# Patient Record
Sex: FEMALE | Race: WHITE | NOT HISPANIC OR LATINO | Employment: OTHER | URBAN - METROPOLITAN AREA
[De-identification: names, ages, dates, MRNs, and addresses within clinical notes are randomized per-mention and may not be internally consistent; named-entity substitution may affect disease eponyms.]

---

## 2017-09-25 ENCOUNTER — OFFICE VISIT (OUTPATIENT)
Dept: INTERNAL MEDICINE | Facility: CLINIC | Age: 77
End: 2017-09-25

## 2017-09-25 VITALS
BODY MASS INDEX: 21.31 KG/M2 | WEIGHT: 132 LBS | SYSTOLIC BLOOD PRESSURE: 128 MMHG | HEART RATE: 67 BPM | DIASTOLIC BLOOD PRESSURE: 62 MMHG | OXYGEN SATURATION: 98 %

## 2017-09-25 DIAGNOSIS — R10.32 LEFT LOWER QUADRANT PAIN: Primary | ICD-10-CM

## 2017-09-25 PROCEDURE — 99213 OFFICE O/P EST LOW 20 MIN: CPT | Performed by: PHYSICIAN ASSISTANT

## 2017-09-25 NOTE — PROGRESS NOTES
Chief Complaint   Patient presents with   • Follow-up     UTC at [facility] on 09/23/17 abdominal pain       Subjective   Cathy Crockett is a 76 y.o. female.       History of Present Illness     Pt went to the [facility] this past weekend, 2 days ago with abdominal pain. She was treated with Flagyl and has had some improvement. She woke up the day prior to urgent care visit with LLQ pain, was relieved with 2 BMs. Still has some discomfort with bending and palpation. She started liquid diet but because mild pain continued, she went to urgent care. Since starting the flagyl she has had improvement. Has had mucousy BMs since then. Today she feels a little bloated, started soft foods yesterday. Not hurting. Has not checked temp since urgent care, temp was 99 then. She is tolerating the flagyl.      Current Outpatient Prescriptions:   •  metroNIDAZOLE (FLAGYL) 500 MG tablet, Take 1 tablet by mouth 2 (Two) Times a Day for 7 days., Disp: 14 tablet, Rfl: 0     PMFSH  The following portions of the patient's history were reviewed and updated as appropriate: allergies, current medications, past family history, past medical history, past social history, past surgical history and problem list.    Review of Systems   Constitutional: Positive for appetite change. Negative for activity change, chills, diaphoresis, fatigue and fever.   HENT: Negative.    Respiratory: Negative for chest tightness, shortness of breath and wheezing.    Cardiovascular: Negative for chest pain.   Gastrointestinal: Positive for abdominal pain. Negative for abdominal distention, blood in stool, constipation, diarrhea and nausea.   Genitourinary: Negative for dysuria and hematuria.   Musculoskeletal: Negative for arthralgias and myalgias.   Neurological: Negative for dizziness, syncope, weakness and light-headedness.   Psychiatric/Behavioral: Negative.        Objective   /62  Pulse 67  Wt 132 lb (59.9 kg)  SpO2 98%  BMI 21.31  kg/m2    Physical Exam   Constitutional: She appears well-developed and well-nourished.   HENT:   Head: Normocephalic.   Right Ear: Hearing, tympanic membrane, external ear and ear canal normal.   Left Ear: Hearing, tympanic membrane, external ear and ear canal normal.   Nose: Nose normal.   Mouth/Throat: Oropharynx is clear and moist.   Eyes: Conjunctivae are normal. Pupils are equal, round, and reactive to light.   Neck: Normal range of motion.   Cardiovascular: Normal rate, regular rhythm and normal heart sounds.    Pulmonary/Chest: Effort normal and breath sounds normal. She has no decreased breath sounds. She has no wheezes. She has no rhonchi. She has no rales.   Abdominal: Soft. Normal appearance and bowel sounds are normal. There is no hepatosplenomegaly. There is tenderness (mild) in the left lower quadrant. There is no rigidity, no rebound, no guarding and no CVA tenderness. No hernia.   Musculoskeletal: Normal range of motion.   Neurological: She is alert.   Skin: Skin is warm and dry.   Psychiatric: She has a normal mood and affect. Her behavior is normal.   Nursing note and vitals reviewed.           ASSESSMENT/PLAN    Problem List Items Addressed This Visit     None      Visit Diagnoses     Left lower quadrant pain    -  Primary    Improving with flagyl. Continue treatment and progress diet as tolerated. Maintain fluid intake. RTC if symptoms worsen or do not resolve completely.               Return for Medicare Wellness with Dr Arana.

## 2017-09-29 PROBLEM — R76.11 POSITIVE REACTION TO TUBERCULIN SKIN TEST: Status: ACTIVE | Noted: 2017-09-29

## 2017-09-29 PROBLEM — G47.00 INSOMNIA: Status: ACTIVE | Noted: 2017-09-29

## 2017-09-29 PROBLEM — J18.9 PNEUMONIA: Status: ACTIVE | Noted: 2017-09-29

## 2017-09-29 PROBLEM — R13.10 DYSPHAGIA: Status: RESOLVED | Noted: 2017-09-29 | Resolved: 2017-09-29

## 2017-09-29 PROBLEM — C44.91 BASAL CELL CARCINOMA OF SKIN: Status: ACTIVE | Noted: 2017-09-29

## 2017-09-29 PROBLEM — R10.13 EPIGASTRIC PAIN: Status: RESOLVED | Noted: 2017-09-29 | Resolved: 2017-09-29

## 2017-09-29 PROBLEM — R13.10 DYSPHAGIA: Status: ACTIVE | Noted: 2017-09-29

## 2017-09-29 PROBLEM — R10.13 EPIGASTRIC PAIN: Status: ACTIVE | Noted: 2017-09-29

## 2017-09-29 PROBLEM — J18.9 PNEUMONIA: Status: RESOLVED | Noted: 2017-09-29 | Resolved: 2017-09-29

## 2017-09-29 PROBLEM — K57.30 DIVERTICULOSIS OF LARGE INTESTINE: Status: ACTIVE | Noted: 2017-09-29

## 2017-09-29 PROBLEM — Z78.0 MENOPAUSE: Status: ACTIVE | Noted: 2017-09-29

## 2017-10-04 ENCOUNTER — TELEPHONE (OUTPATIENT)
Dept: INTERNAL MEDICINE | Facility: CLINIC | Age: 77
End: 2017-10-04

## 2017-10-04 NOTE — TELEPHONE ENCOUNTER
Spoke to patient.     She is improving. She will keep follow up appt on Friday.     Records requested from [facility].

## 2017-10-04 NOTE — TELEPHONE ENCOUNTER
PT WAS SEEN IN THE ER Monday AT [facility]  THEY DID A CT SCAN ABDOMEN AND SHE WANTED TO KNOW IF WE COULD GET THE RECORDS. SHE WAS STARTED ON FLAGYL AND CIPRO FOR DIVERTICULITIS   SHE MADE AN APPT TO SEE AMY ON Friday.  SHE SAW HER RECENTLY SHE WANTED TO KNOW IF WHEN SHE WANTED TO KNOW WHEN SHE SHOULD BE SEEN OR IF Friday IS OK  PLEASE ADVISE  [phone]

## 2017-10-06 ENCOUNTER — OFFICE VISIT (OUTPATIENT)
Dept: INTERNAL MEDICINE | Facility: CLINIC | Age: 77
End: 2017-10-06

## 2017-10-06 VITALS
OXYGEN SATURATION: 98 % | SYSTOLIC BLOOD PRESSURE: 136 MMHG | WEIGHT: 130 LBS | BODY MASS INDEX: 20.98 KG/M2 | DIASTOLIC BLOOD PRESSURE: 62 MMHG | HEART RATE: 76 BPM

## 2017-10-06 DIAGNOSIS — D36.14 NEUROFIBROMA OF THORACIC REGION: ICD-10-CM

## 2017-10-06 DIAGNOSIS — R10.32 ABDOMINAL PAIN, LEFT LOWER QUADRANT: Primary | ICD-10-CM

## 2017-10-06 LAB
ALBUMIN SERPL-MCNC: 4.4 G/DL (ref 3.2–4.8)
ALBUMIN/GLOB SERPL: 1.7 G/DL (ref 1.5–2.5)
ALP SERPL-CCNC: 64 U/L (ref 25–100)
ALT SERPL W P-5'-P-CCNC: 35 U/L (ref 7–40)
ANION GAP SERPL CALCULATED.3IONS-SCNC: 8 MMOL/L (ref 3–11)
AST SERPL-CCNC: 48 U/L (ref 0–33)
BILIRUB BLD-MCNC: NEGATIVE MG/DL
BILIRUB SERPL-MCNC: 0.4 MG/DL (ref 0.3–1.2)
BUN BLD-MCNC: 6 MG/DL (ref 9–23)
BUN/CREAT SERPL: 10 (ref 7–25)
CALCIUM SPEC-SCNC: 9.7 MG/DL (ref 8.7–10.4)
CHLORIDE SERPL-SCNC: 97 MMOL/L (ref 99–109)
CLARITY, POC: CLEAR
CO2 SERPL-SCNC: 30 MMOL/L (ref 20–31)
COLOR UR: YELLOW
CREAT BLD-MCNC: 0.6 MG/DL (ref 0.6–1.3)
DEPRECATED RDW RBC AUTO: 40 FL (ref 37–54)
ERYTHROCYTE [DISTWIDTH] IN BLOOD BY AUTOMATED COUNT: 12.6 % (ref 11.3–14.5)
GFR SERPL CREATININE-BSD FRML MDRD: 97 ML/MIN/1.73
GLOBULIN UR ELPH-MCNC: 2.6 GM/DL
GLUCOSE BLD-MCNC: 108 MG/DL (ref 70–100)
GLUCOSE UR STRIP-MCNC: NEGATIVE MG/DL
HCT VFR BLD AUTO: 42.7 % (ref 34.5–44)
HGB BLD-MCNC: 14 G/DL (ref 11.5–15.5)
KETONES UR QL: NEGATIVE
LEUKOCYTE EST, POC: NEGATIVE
MCH RBC QN AUTO: 28.3 PG (ref 27–31)
MCHC RBC AUTO-ENTMCNC: 32.8 G/DL (ref 32–36)
MCV RBC AUTO: 86.3 FL (ref 80–99)
NITRITE UR-MCNC: NEGATIVE MG/ML
PH UR: 7 [PH] (ref 5–8)
PLATELET # BLD AUTO: 321 10*3/MM3 (ref 150–450)
PMV BLD AUTO: 9.9 FL (ref 6–12)
POTASSIUM BLD-SCNC: 4.3 MMOL/L (ref 3.5–5.5)
PROT SERPL-MCNC: 7 G/DL (ref 5.7–8.2)
PROT UR STRIP-MCNC: NEGATIVE MG/DL
RBC # BLD AUTO: 4.95 10*6/MM3 (ref 3.89–5.14)
RBC # UR STRIP: NEGATIVE /UL
SODIUM BLD-SCNC: 135 MMOL/L (ref 132–146)
SP GR UR: 1 (ref 1–1.03)
UROBILINOGEN UR QL: NORMAL
WBC NRBC COR # BLD: 4.44 10*3/MM3 (ref 3.5–10.8)

## 2017-10-06 PROCEDURE — 99213 OFFICE O/P EST LOW 20 MIN: CPT | Performed by: PHYSICIAN ASSISTANT

## 2017-10-06 PROCEDURE — 81003 URINALYSIS AUTO W/O SCOPE: CPT | Performed by: PHYSICIAN ASSISTANT

## 2017-10-06 PROCEDURE — 80053 COMPREHEN METABOLIC PANEL: CPT | Performed by: PHYSICIAN ASSISTANT

## 2017-10-06 PROCEDURE — 85027 COMPLETE CBC AUTOMATED: CPT | Performed by: PHYSICIAN ASSISTANT

## 2017-10-06 RX ORDER — HYDROCODONE BITARTRATE AND ACETAMINOPHEN 7.5; 325 MG/1; MG/1
1 TABLET ORAL AS NEEDED
COMMUNITY
Start: 2017-10-02 | End: 2017-11-07

## 2017-10-06 RX ORDER — CIPROFLOXACIN 500 MG/1
1 TABLET, FILM COATED ORAL 2 TIMES DAILY
COMMUNITY
Start: 2017-10-02 | End: 2017-11-07

## 2017-10-06 RX ORDER — METRONIDAZOLE 500 MG/1
1 TABLET ORAL EVERY 8 HOURS
COMMUNITY
Start: 2017-10-02 | End: 2017-11-07

## 2017-10-06 NOTE — PROGRESS NOTES
Chief Complaint   Patient presents with   • Follow-up     [place] on 10/02 for diverticulitis        Subjective   Cathy Crockett is a 76 y.o. female.       History of Present Illness     Since last visit pt finished the flagyl prescription and resumed her normal diet. A few days later she started feeling much worse with fatigue, malaise, chills, temp was 100. Went to ER and had CT of abd/pelvis that showed small intestine gastroenteritis, no diverticulitis. She was started on cipro and flagyl. She has been maintaining a low residue diet with mostly liquids and oatmeal and protein. Has digested them easily but continues to have some malaise. Has some remaining mild LLQ discomfort.    CT of abdomen/pelvis showed gastroenteritis in small intestine; diverticulosis but no diverticulitis; incidental finding of schwannoma vs neurofibroma in her right posterior mediastinum. Radiologist recommended 3-6 month but mentioned the location is amenable to biopsy.      Current Outpatient Prescriptions:   •  ciprofloxacin (CIPRO) 500 MG tablet, Take 1 tablet by mouth 2 (Two) Times a Day., Disp: , Rfl:   •  HYDROcodone-acetaminophen (NORCO) 7.5-325 MG per tablet, Take 1 tablet by mouth As Needed., Disp: , Rfl:   •  metroNIDAZOLE (FLAGYL) 500 MG tablet, Take 1 tablet by mouth Every 8 (Eight) Hours., Disp: , Rfl:      PMFSH  The following portions of the patient's history were reviewed and updated as appropriate: allergies, current medications, past family history, past medical history, past social history, past surgical history and problem list.    Review of Systems   Constitutional: Positive for appetite change and fatigue. Negative for activity change, chills, diaphoresis and fever.   HENT: Negative.  Negative for congestion, postnasal drip, rhinorrhea and sore throat.    Respiratory: Negative for chest tightness, shortness of breath and wheezing.    Cardiovascular: Negative for chest pain.   Gastrointestinal: Positive for  abdominal pain. Negative for blood in stool, constipation, diarrhea, nausea, rectal pain and vomiting.   Genitourinary: Negative for dysuria, flank pain, frequency, hematuria and pelvic pain.   Musculoskeletal: Negative for arthralgias and myalgias.   Neurological: Negative for dizziness, syncope, weakness, light-headedness and headaches.   Psychiatric/Behavioral: Negative.  Negative for dysphoric mood. The patient is not nervous/anxious.        Objective   /62  Pulse 76  Wt 130 lb (59 kg)  SpO2 98%  BMI 20.98 kg/m2    Physical Exam   Constitutional: She appears well-developed and well-nourished.   HENT:   Head: Normocephalic.   Right Ear: Hearing, tympanic membrane, external ear and ear canal normal.   Left Ear: Hearing, tympanic membrane, external ear and ear canal normal.   Nose: Nose normal.   Mouth/Throat: Oropharynx is clear and moist.   Eyes: Conjunctivae are normal. Pupils are equal, round, and reactive to light.   Neck: Normal range of motion.   Cardiovascular: Normal rate, regular rhythm and normal heart sounds.    Pulmonary/Chest: Effort normal and breath sounds normal. She has no decreased breath sounds. She has no wheezes. She has no rhonchi. She has no rales.   Abdominal: Soft. Normal appearance and bowel sounds are normal. There is tenderness (mild) in the left lower quadrant.   Musculoskeletal: Normal range of motion.   Neurological: She is alert.   Skin: Skin is warm and dry.   Psychiatric: She has a normal mood and affect. Her behavior is normal.   Nursing note and vitals reviewed.      Results for orders placed or performed in visit on 10/06/17   Comprehensive Metabolic Panel   Result Value Ref Range    Glucose 108 (H) 70 - 100 mg/dL    BUN 6 (L) 9 - 23 mg/dL    Creatinine 0.60 0.60 - 1.30 mg/dL    Sodium 135 132 - 146 mmol/L    Potassium 4.3 3.5 - 5.5 mmol/L    Chloride 97 (L) 99 - 109 mmol/L    CO2 30.0 20.0 - 31.0 mmol/L    Calcium 9.7 8.7 - 10.4 mg/dL    Total Protein 7.0 5.7 - 8.2  g/dL    Albumin 4.40 3.20 - 4.80 g/dL    ALT (SGPT) 35 7 - 40 U/L    AST (SGOT) 48 (H) 0 - 33 U/L    Alkaline Phosphatase 64 25 - 100 U/L    Total Bilirubin 0.4 0.3 - 1.2 mg/dL    eGFR Non African Amer 97 >60 mL/min/1.73    Globulin 2.6 gm/dL    A/G Ratio 1.7 1.5 - 2.5 g/dL    BUN/Creatinine Ratio 10.0 7.0 - 25.0    Anion Gap 8.0 3.0 - 11.0 mmol/L   CBC (No Diff)   Result Value Ref Range    WBC 4.44 3.50 - 10.80 10*3/mm3    RBC 4.95 3.89 - 5.14 10*6/mm3    Hemoglobin 14.0 11.5 - 15.5 g/dL    Hematocrit 42.7 34.5 - 44.0 %    MCV 86.3 80.0 - 99.0 fL    MCH 28.3 27.0 - 31.0 pg    MCHC 32.8 32.0 - 36.0 g/dL    RDW 12.6 11.3 - 14.5 %    RDW-SD 40.0 37.0 - 54.0 fl    MPV 9.9 6.0 - 12.0 fL    Platelets 321 150 - 450 10*3/mm3   POCT urinalysis dipstick, automated   Result Value Ref Range    Color Yellow Yellow, Straw, Dark Yellow, Adelina    Clarity, UA Clear Clear    Glucose, UA Negative Negative, 1000 mg/dL (3+) mg/dL    Bilirubin Negative Negative    Ketones, UA Negative Negative    Specific Gravity  1.005 1.005 - 1.030    Blood, UA Negative Negative    pH, Urine 7.0 5.0 - 8.0    Protein, POC Negative Negative mg/dL    Urobilinogen, UA Normal Normal    Leukocytes Negative Negative    Nitrite, UA Negative Negative        ASSESSMENT/PLAN    Problem List Items Addressed This Visit        Other    Neurofibroma of thoracic region     Located right posterior mediastinum, incidental finding on CT 10/2/17. Discussed follow up CT vs biopsy and decided pt will discuss with Dr Arana at her upcoming appointment in November- pt is currently recovering from GI illness and would like to hold off on any further testing.           Other Visit Diagnoses     Abdominal pain, left lower quadrant    -  Primary    Complete cipro and flagyl as directed. Progress diet as tolerated. Check cbc and cmp. In office u/a is negative.    Relevant Orders    Comprehensive Metabolic Panel (Completed)    CBC (No Diff) (Completed)    POCT urinalysis dipstick,  automated (Completed)               Return if symptoms worsen or fail to improve, for Next scheduled follow up.

## 2017-10-07 NOTE — ASSESSMENT & PLAN NOTE
Located right posterior mediastinum, incidental finding on CT 10/2/17. Discussed follow up CT vs biopsy and decided pt will discuss with Dr Arana at her upcoming appointment in November- pt is currently recovering from GI illness and would like to hold off on any further testing.

## 2017-10-10 NOTE — PROGRESS NOTES
Please let her know that her labs showed one mildly elevated liver enzyme but were otherwise stable.

## 2017-11-07 ENCOUNTER — OFFICE VISIT (OUTPATIENT)
Dept: INTERNAL MEDICINE | Facility: CLINIC | Age: 77
End: 2017-11-07

## 2017-11-07 VITALS
DIASTOLIC BLOOD PRESSURE: 66 MMHG | WEIGHT: 131 LBS | SYSTOLIC BLOOD PRESSURE: 130 MMHG | HEIGHT: 66 IN | BODY MASS INDEX: 21.05 KG/M2

## 2017-11-07 DIAGNOSIS — D36.14 NEUROFIBROMA OF THORACIC REGION: ICD-10-CM

## 2017-11-07 DIAGNOSIS — Z78.0 MENOPAUSE: ICD-10-CM

## 2017-11-07 DIAGNOSIS — Z00.00 MEDICARE ANNUAL WELLNESS VISIT, SUBSEQUENT: Primary | ICD-10-CM

## 2017-11-07 DIAGNOSIS — Z87.19 HX OF DIVERTICULITIS OF COLON: ICD-10-CM

## 2017-11-07 PROBLEM — N95.2 POSTMENOPAUSAL ATROPHIC VAGINITIS: Status: ACTIVE | Noted: 2017-11-07

## 2017-11-07 PROBLEM — H25.012 CORTICAL AGE-RELATED CATARACT OF LEFT EYE: Status: ACTIVE | Noted: 2017-11-07

## 2017-11-07 PROCEDURE — G0444 DEPRESSION SCREEN ANNUAL: HCPCS | Performed by: INTERNAL MEDICINE

## 2017-11-07 PROCEDURE — G0009 ADMIN PNEUMOCOCCAL VACCINE: HCPCS | Performed by: INTERNAL MEDICINE

## 2017-11-07 PROCEDURE — 99214 OFFICE O/P EST MOD 30 MIN: CPT | Performed by: INTERNAL MEDICINE

## 2017-11-07 PROCEDURE — G0439 PPPS, SUBSEQ VISIT: HCPCS | Performed by: INTERNAL MEDICINE

## 2017-11-07 PROCEDURE — 90732 PPSV23 VACC 2 YRS+ SUBQ/IM: CPT | Performed by: INTERNAL MEDICINE

## 2017-11-07 NOTE — ASSESSMENT & PLAN NOTE
Health Maintenance - flu vaccine refused/declined, counseling given, Prevnar 2/16, PVX given today, Tdap 7/12, rec Zostavax (or waiting for recs next yr with new shingles vaccine); mammogram due after 1/30/18; Pap and GYN care per Dr. Juarez, to be scheduled by patient (notes h/o vaginal scar tissue due to surgery); DEXA ordered (last 2/14); colonoscopy 2/14, repeat 2024 per Dr. Peoples; eye exam 9/17 with Dr. Francis (monitored for new left cataract); dental exam 10/17, updated every 6 mos; labs done 10/17 - will add TSH, lipids, CMP (lab order given to patient)    Consultants:  Patient Care Team:  Nicole Arana MD as PCP - General (Internal Medicine)  Surinder Peoples MD as Consulting Physician (Colon and Rectal Surgery)  Fransico Juarez MD as Consulting Physician (Obstetrics and Gynecology)  Surinder Fontaine MD as Consulting Physician (Infectious Diseases)  Fransico Kimble MD as Consulting Physician (Dermatology)  Chay Francis III, MD as Consulting Physician (Ophthalmology)

## 2017-11-07 NOTE — PROGRESS NOTES
ANNUAL WELLNESS VISIT    DRUG AND ALCOHOL USE      no tobacco use, alcohol intake:glass of wine 3-4 times a week  and caffeine intake: 3 cups of caffeinated coffee per day    DIET AND PHYSICAL ACTIVITY     Diet: low fat    Exercise: frequently   Exercise Details: walking, aerobic work-outs and yoga, marva    MOOD DISORDER AND COGNITIVE SCREENING   Depression Screening Tool Used yes - see PHQ-9   Anxiety Screening Tool Used yes     Mini-Cog Performed   Yes    1. Tell Patient 3 Words table,car,book    2. Administer Clock Test normal    3. Recall 3 words  table,car,book    4. Number Correct Items 3    FUNCTIONAL ABILITY AND LEVEL OF SAFETY   Hearing no hearing loss     Wears Hearing Aids No       Current Activities Independent      none  - see Funct/Cog Status Intake     Fall Risk Assessment       Has difficulty with walking or balance  No         Timed Up and Go (TUG) Test  4 sec.       If >12 sec, normal    ADVANCED DIRECTIVE has NO advance directive - information provided to the patient today    PAIN SCREENING Do you have pain right now? no          Recent Hospitalizations:  No hospitalization(s) within the last year..     MEDICATION REVIEW   - updated and reviewed (see Medication List).   - reviewed for potentially harmful drug-disease interactions in the elderly.   - reviewed for high risk medications in the elderly.   - aspirin use: No   _________________________________________________  Chief Complaint   Patient presents with   • Medicare Wellness       History of Present Illness  77 y.o.  woman presents for updated wellness visit.  Feels well overall.  Notes bout of diverticulitis last month, s/p abx, now resolved.  States  had ruptured diverticulum.    Review of Systems  Denies headaches, visual changes, CP, palpitations, SOB, cough, abd pain, n/v/d, difficulty with urination, numbness/tingling, vaginal bleeding/discharge falls, mood changes, lightheadedness, hearing changes, rashes.    ROS  "(+) for abnl finding on CT abd/pelvis with schwannoma.    ROS (+) for vaginal dryness; uses lubrication.  Reports h/o scar tissue in the vaginal canal due to h/o surgery (CALISTA/BSO) and plans to f/u with Dr. Juarez.       Notes updated eye exam in 9/17 and being monitored for beginning cataract in the left eye.     ROS (+) for hard spot in the right hand, noticeable after doing a lot of yardwork, repetitive motions.  Has been massaging it.  Denies change in size. All other ROS reviewed and negative.    PMSFH  The following portions of the patient's history were reviewed and updated as appropriate: allergies, current medications, past family history, past medical history, past social history, past surgical history and problem list.    Current Outpatient Prescriptions:   •  Calcium Carb 1000mg QD  •  Cholecalciferol (VITAMIN D3) 5000 units QD (most days)  •  VITAMIN K2 PO with coQ10 QD    VITALS:  /66  Ht 65.75\" (167 cm)  Wt 131 lb (59.4 kg)  BMI 21.31 kg/m2    Physical Exam   Constitutional: She is oriented to person, place, and time. She appears well-developed and well-nourished.   HENT:   Head: Normocephalic.   Right Ear: External ear normal.   Left Ear: External ear normal.   Nose: Nose normal.   Mouth/Throat: Oropharynx is clear and moist and mucous membranes are normal. No oropharyngeal exudate.   Eyes: Conjunctivae and EOM are normal. Pupils are equal, round, and reactive to light.   Neck: Normal range of motion. Neck supple. Carotid bruit is not present (bilaterally). No thyromegaly present.   Cardiovascular: Normal rate, regular rhythm and normal heart sounds.    Pulmonary/Chest: Effort normal and breath sounds normal. No respiratory distress. She has no wheezes. She has no rales.   Abdominal: Soft. Bowel sounds are normal. She exhibits no distension and no mass. There is no hepatosplenomegaly. There is no tenderness.   Genitourinary:   Genitourinary Comments: Breast/pelvic exams deferred to GYN   "   Musculoskeletal: Normal range of motion. She exhibits deformity (distal R. palmar 5th metacarpal area with 5mm well-circ smooth nontender nodule, mobile; L. palmar 4th/5th metacarpal area distally with contracture; fingers bilaterally FROM). She exhibits no edema.   Lymphadenopathy:     She has no cervical adenopathy.   Neurological: She is alert and oriented to person, place, and time. She has normal reflexes. She displays normal reflexes. No cranial nerve deficit.   Skin: Skin is warm and dry. No rash noted.   Psychiatric: She has a normal mood and affect. Her behavior is normal.   Nursing note and vitals reviewed.      LABS  Results for orders placed or performed in visit on 10/06/17   Comprehensive Metabolic Panel   Result Value Ref Range    Glucose 108 (H) 70 - 100 mg/dL    BUN 6 (L) 9 - 23 mg/dL    Creatinine 0.60 0.60 - 1.30 mg/dL    Sodium 135 132 - 146 mmol/L    Potassium 4.3 3.5 - 5.5 mmol/L    Chloride 97 (L) 99 - 109 mmol/L    CO2 30.0 20.0 - 31.0 mmol/L    Calcium 9.7 8.7 - 10.4 mg/dL    Total Protein 7.0 5.7 - 8.2 g/dL    Albumin 4.40 3.20 - 4.80 g/dL    ALT (SGPT) 35 7 - 40 U/L    AST (SGOT) 48 (H) 0 - 33 U/L    Alkaline Phosphatase 64 25 - 100 U/L    Total Bilirubin 0.4 0.3 - 1.2 mg/dL    eGFR Non African Amer 97 >60 mL/min/1.73    Globulin 2.6 gm/dL    A/G Ratio 1.7 1.5 - 2.5 g/dL    BUN/Creatinine Ratio 10.0 7.0 - 25.0    Anion Gap 8.0 3.0 - 11.0 mmol/L   CBC (No Diff)   Result Value Ref Range    WBC 4.44 3.50 - 10.80 10*3/mm3    RBC 4.95 3.89 - 5.14 10*6/mm3    Hemoglobin 14.0 11.5 - 15.5 g/dL    Hematocrit 42.7 34.5 - 44.0 %    MCV 86.3 80.0 - 99.0 fL    MCH 28.3 27.0 - 31.0 pg    MCHC 32.8 32.0 - 36.0 g/dL    RDW 12.6 11.3 - 14.5 %    RDW-SD 40.0 37.0 - 54.0 fl    MPV 9.9 6.0 - 12.0 fL    Platelets 321 150 - 450 10*3/mm3   POCT urinalysis dipstick, automated   Result Value Ref Range    Color Yellow Yellow, Straw, Dark Yellow, Adelina    Clarity, UA Clear Clear    Glucose, UA Negative Negative,  1000 mg/dL (3+) mg/dL    Bilirubin Negative Negative    Ketones, UA Negative Negative    Specific Gravity  1.005 1.005 - 1.030    Blood, UA Negative Negative    pH, Urine 7.0 5.0 - 8.0    Protein, POC Negative Negative mg/dL    Urobilinogen, UA Normal Normal    Leukocytes Negative Negative    Nitrite, UA Negative Negative       ASSESSMENT/PLAN  Problem List Items Addressed This Visit     Menopause    Relevant Orders    DEXA Bone Density Axial    Neurofibroma of thoracic region     rec f/u CT chest 4/18 for R. posterior mediastinum schwannoma vs neurofibroma         Medicare annual wellness visit, subsequent - Primary     Health Maintenance - flu vaccine refused/declined, counseling given, Prevnar 2/16, PVX given today, Tdap 7/12, rec Zostavax (or waiting for recs next yr with new shingles vaccine); mammogram due after 1/30/18; Pap and GYN care per Dr. Juarez, to be scheduled by patient (notes h/o vaginal scar tissue due to surgery); DEXA ordered (last 2/14); colonoscopy 2/14, repeat 2024 per Dr. Peoples; eye exam 9/17 with Dr. Francis (monitored for new left cataract); dental exam 10/17, updated every 6 mos; labs done 10/17 - will add TSH, lipids, CMP (lab order given to patient)    Consultants:  Patient Care Team:  Nicole Arana MD as PCP - General (Internal Medicine)  Surinder Peoples MD as Consulting Physician (Colon and Rectal Surgery)  Fransico Juarez MD as Consulting Physician (Obstetrics and Gynecology)  Surinder Fontaine MD as Consulting Physician (Infectious Diseases)  Fransico Kimble MD as Consulting Physician (Dermatology)  Chay Francis III, MD as Consulting Physician (Ophthalmology)                     Relevant Orders    Lipid Panel    TSH    Comprehensive Metabolic Panel      Other Visit Diagnoses     Hx of diverticulitis of colon        resolved; discussed monitoring for triggers, such as small seed/nuts; adequate fiber intake; last colonosc 2/14          FOLLOW-UP  1. Needs f/u CT chest in 6  mos for surveillance of R. posterior mediastinum schwannoma vs neurofibroma (incidental finding on CT abd/pelvis for diverticulitis) - got last one at [facility]  2. RTC 1yr for annual wellness visit; fasting labs the week prior to appt (CBC, CMP, TSH, lipids, UA/micro)      Electronically signed by:    Nicole Arana MD  11/07/2017

## 2017-11-10 ENCOUNTER — LAB (OUTPATIENT)
Dept: LAB | Facility: HOSPITAL | Age: 77
End: 2017-11-10

## 2017-11-10 DIAGNOSIS — Z00.00 MEDICARE ANNUAL WELLNESS VISIT, SUBSEQUENT: ICD-10-CM

## 2017-11-10 LAB
ALBUMIN SERPL-MCNC: 4.3 G/DL (ref 3.2–4.8)
ALBUMIN/GLOB SERPL: 1.9 G/DL (ref 1.5–2.5)
ALP SERPL-CCNC: 65 U/L (ref 25–100)
ALT SERPL W P-5'-P-CCNC: 14 U/L (ref 7–40)
ANION GAP SERPL CALCULATED.3IONS-SCNC: 1 MMOL/L (ref 3–11)
ARTICHOKE IGE QN: 110 MG/DL (ref 0–130)
AST SERPL-CCNC: 19 U/L (ref 0–33)
BILIRUB SERPL-MCNC: 0.5 MG/DL (ref 0.3–1.2)
BUN BLD-MCNC: 8 MG/DL (ref 9–23)
BUN/CREAT SERPL: 16 (ref 7–25)
CALCIUM SPEC-SCNC: 9.1 MG/DL (ref 8.7–10.4)
CHLORIDE SERPL-SCNC: 105 MMOL/L (ref 99–109)
CHOLEST SERPL-MCNC: 180 MG/DL (ref 0–200)
CO2 SERPL-SCNC: 33 MMOL/L (ref 20–31)
CREAT BLD-MCNC: 0.5 MG/DL (ref 0.6–1.3)
GFR SERPL CREATININE-BSD FRML MDRD: 120 ML/MIN/1.73
GLOBULIN UR ELPH-MCNC: 2.3 GM/DL
GLUCOSE BLD-MCNC: 87 MG/DL (ref 70–100)
HDLC SERPL-MCNC: 66 MG/DL (ref 40–60)
POTASSIUM BLD-SCNC: 4 MMOL/L (ref 3.5–5.5)
PROT SERPL-MCNC: 6.6 G/DL (ref 5.7–8.2)
SODIUM BLD-SCNC: 139 MMOL/L (ref 132–146)
TRIGL SERPL-MCNC: 59 MG/DL (ref 0–150)
TSH SERPL DL<=0.05 MIU/L-ACNC: 1.96 MIU/ML (ref 0.35–5.35)

## 2017-11-10 PROCEDURE — 80061 LIPID PANEL: CPT | Performed by: INTERNAL MEDICINE

## 2017-11-10 PROCEDURE — 80053 COMPREHEN METABOLIC PANEL: CPT | Performed by: INTERNAL MEDICINE

## 2017-11-10 PROCEDURE — 84443 ASSAY THYROID STIM HORMONE: CPT | Performed by: INTERNAL MEDICINE

## 2017-11-14 NOTE — PROGRESS NOTES
Dear Ms. Crockett,    Thank you for obtaining the laboratories that we ordered.  I have received those results and would like to review them with you.    Your thyroid test and electrolytes are within normal limits.  This indicates no diabetes, as well as normal thyroid, liver, and kidney function.    Your cholesterol profile is stable, showing a total cholesterol of 180 (goal < 200).  Your triglycerides are acceptable at 59 with a goal < 150.  Your HDL, the good cholesterol, is 66.  HDL is heart protective, and the goal is > 50 in women.  Your LDL, the bad cholesterol, is 110.  Goal for LDL is < 130.    Overall your labs are stable.  Let me know if you have any questions or concerns regarding these results.  Also remember that we will need a follow-up chest CT around April 2018.      Sincerely,  Nicole Arana MD

## 2017-11-22 ENCOUNTER — HOSPITAL ENCOUNTER (OUTPATIENT)
Dept: BONE DENSITY | Facility: HOSPITAL | Age: 77
Discharge: HOME | End: 2017-11-22
Attending: INTERNAL MEDICINE | Admitting: INTERNAL MEDICINE

## 2017-11-22 DIAGNOSIS — Z78.0 MENOPAUSE: ICD-10-CM

## 2017-11-22 PROCEDURE — 77080 DXA BONE DENSITY AXIAL: CPT

## 2017-11-27 NOTE — PROGRESS NOTES
Dear Ms. Crockett,    Thank you for obtaining your DEXA (bone density) scan.  I have received those results and would like to review them with you.      Your bone density remains in the normal range.  It is stable as compared to your last DEXA in 2014.    Please maintain regular calcium and vitamin D supplementation.  Calcium intake should be 1000mg per day between diet and supplements.  Weight bearing exercise is good for bone health as well.    We should plan to repeat your DEXA in 3-4 years.  Please let me know if you have any questions or concerns regarding these results.        Sincerely,  Nicole Arana MD

## 2018-08-08 ENCOUNTER — OFFICE VISIT (OUTPATIENT)
Dept: INTERNAL MEDICINE | Facility: CLINIC | Age: 78
End: 2018-08-08

## 2018-08-08 VITALS
DIASTOLIC BLOOD PRESSURE: 64 MMHG | BODY MASS INDEX: 21.38 KG/M2 | HEART RATE: 69 BPM | OXYGEN SATURATION: 98 % | SYSTOLIC BLOOD PRESSURE: 114 MMHG | WEIGHT: 133 LBS | HEIGHT: 66 IN

## 2018-08-08 DIAGNOSIS — L02.429 BOIL OF LOWER EXTREMITY: Primary | ICD-10-CM

## 2018-08-08 PROCEDURE — 99213 OFFICE O/P EST LOW 20 MIN: CPT | Performed by: NURSE PRACTITIONER

## 2018-08-08 RX ORDER — SULFAMETHOXAZOLE AND TRIMETHOPRIM 800; 160 MG/1; MG/1
1 TABLET ORAL 2 TIMES DAILY
Qty: 20 TABLET | Refills: 0 | Status: SHIPPED | OUTPATIENT
Start: 2018-08-08 | End: 2018-09-22

## 2018-08-08 NOTE — PROGRESS NOTES
"CHIEF COMPLAINT  Chief Complaint   Patient presents with   • Cyst     located on right calf x3 weeks.        HPI  Cathy Crockett is a 77 y.o. 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 Refer for EGD d/t strong family history of CA, including GI CA.;    • Epigastric pain 02/04/2016    Impression: 02/04/2016 - Trial of omeprazole 40 mg daily. Refer for EGD.;    • Hx of bone density study 02/19/2014    nl DEXA (2/19/14), L 0.5, H -0.6; repeat 3-4 yrs   • Hx of bone density study 11/27/2017    nl DEXA (11/27/17) L0.3, H -0.4, repeat 3-4 yrs   • Hx of colonoscopy 02/27/2014    nl colonosc (2/27/14) except diverticulosis, repeat 10 yrs; CRS - Dr. Peoples   • Olecranon bursitis    • Pneumonia 02/07/2014    Impression: 02/07/2014 - lung exam unremarkable today; not unexpected to have some lingering fatigue; rec f/u CXR in 3-4 weeks for confirmation of clearance; strongly encouraged patient to update PVX; Description: RLL pneumonia by CXR (1/14)       Family History   Problem Relation Age of Onset   • Cancer Mother         malignant appendiceal neoplasm   • Hodgkin's lymphoma Sister    • Kidney cancer Brother         RCC   • Heart attack Father    • Leukemia Father         AML   • Aortic aneurysm Father         AAA       Social History     Social History   • Marital status:      Spouse name: N/A   • Number of children: 2   • Years of education: N/A     Occupational History   • retired    • previous       Social History Main " "Topics   • Smoking status: Former Smoker     Quit date: 1960   • Smokeless tobacco: Never Used      Comment: h/p 4-5 years smoking   • Alcohol use Yes      Comment: 2-3 per week   • Drug use: No   • Sexual activity: Defer     Other Topics Concern   • Not on file     Social History Narrative    Exercises - gym, clogs, marva       ROS  Review of Systems   Skin: Positive for wound.   All other systems reviewed and are negative.      /64   Pulse 69   Ht 167 cm (65.75\")   Wt 60.3 kg (133 lb)   SpO2 98%   BMI 21.63 kg/m²     PHYSICAL EXAM  Physical Exam   Constitutional: She is oriented to person, place, and time. She appears well-developed and well-nourished.   HENT:   Head: Normocephalic and atraumatic.   Neurological: She is alert and oriented to person, place, and time.   Skin: Skin is warm and dry.   Medial aspect of right lower leg, dime-sized firm nodule with dry white core; mild surrounding erythema; no swelling   Vitals reviewed.      ASSESSMENT/PLAN  1. Boil of lower extremity  -start prophylactic abx tx  -instructed not to attempt draining to decrease chance of infection  - sulfamethoxazole-trimethoprim (BACTRIM DS) 800-160 MG per tablet; Take 1 tablet by mouth 2 (Two) Times a Day.  Dispense: 20 tablet; Refill: 0  -instructed to f/u sooner than next week if increased redness, warmth, swelling, or any tenderness      FOLLOW-UP  5 day(s) with me for recheck; otherwise next scheduled f/u with Dr. Arana    RTC sooner as needed.    Patient verbalizes understanding of medication dosage, comfort measures, instructions for treatment and follow-up.    Cristal Aden, APRN  08/08/2018      "

## 2018-08-17 ENCOUNTER — OFFICE VISIT (OUTPATIENT)
Dept: INTERNAL MEDICINE | Facility: CLINIC | Age: 78
End: 2018-08-17

## 2018-08-17 VITALS
BODY MASS INDEX: 21.81 KG/M2 | DIASTOLIC BLOOD PRESSURE: 60 MMHG | SYSTOLIC BLOOD PRESSURE: 122 MMHG | WEIGHT: 134.13 LBS | RESPIRATION RATE: 16 BRPM | HEART RATE: 80 BPM

## 2018-08-17 DIAGNOSIS — L02.415 CUTANEOUS ABSCESS OF RIGHT LOWER EXTREMITY: Primary | ICD-10-CM

## 2018-08-17 DIAGNOSIS — D36.14 NEUROFIBROMA OF THORACIC REGION: ICD-10-CM

## 2018-08-17 DIAGNOSIS — Z71.85 VACCINE COUNSELING: ICD-10-CM

## 2018-08-17 PROCEDURE — 99214 OFFICE O/P EST MOD 30 MIN: CPT | Performed by: INTERNAL MEDICINE

## 2018-08-17 RX ORDER — UBIDECARENONE 100 MG
100 CAPSULE ORAL DAILY
COMMUNITY

## 2018-08-17 NOTE — ASSESSMENT & PLAN NOTE
Incidental 1.9cm soft tissue density on CT abdomen/pelvis in 10/17, suggestive of schwannoma versus neurofibroma; follow-up CT chest ordered for serial examination

## 2018-08-17 NOTE — PROGRESS NOTES
Chief Complaint   Patient presents with   • Abscess     right inner calf       History of Present Illness  77 y.o.  woman presents for follow-up re: wound on her right leg.  HPI started about 1month ago, was shaving and knicked something.  Lesion got very red swollen, and painful. Got even bluish at its max size.  Was going up and down in size then finally decided to get evaluated.    Was seen here in the office last week and prescribed bactrim.  She has been soaking it and keeping it covered.  States swelling, size of area, and redness has decreased/improved.  Currently with some tenderness, no itching and no drainage.  Has not had fevers.    States she is also overdue for her CT chest to evaluate incidental lesion seen last year.    Review of Systems  ROS (+) for right leg infection/wound, decreasing in size, overall improving.  No prev similar lesions. Denies cough, SOB, coughing up blood.   All other ROS reviewed and negative.    [facility]  The following portions of the patient's history were reviewed and updated as appropriate: allergies, current medications, past family history, past medical history, past social history, past surgical history and problem list.    Current Outpatient Prescriptions:   •  Calcium Carb-Cholecalciferol (CALCIUM 1000 + D PO), QD  •  Cholecalciferol (VITAMIN D3) 5000 units QD  •  coenzyme Q10 100 MG capsule, QD  •  Menaquinone-7 (VITAMIN K2 PO), QD   •  sulfamethoxazole-trimethoprim (BACTRIM DS) 800-160 MG BID (2 days left)    VITALS:  /60 (BP Location: Left arm, Patient Position: Sitting)   Pulse 80   Resp 16   Wt 60.8 kg (134 lb 2 oz)   BMI 21.81 kg/m²     Physical Exam   Constitutional: She is oriented to person, place, and time. She appears well-developed and well-nourished.   Eyes: Conjunctivae and EOM are normal.   Cardiovascular: Normal rate, regular rhythm and normal heart sounds.    Pulmonary/Chest: Effort normal and breath sounds normal. No respiratory  distress.   Neurological: She is alert and oriented to person, place, and time.   Skin: Skin is warm and dry.   Right ant mid-shin with 1cm domed lesion with white center, 5mm surrounding faint erythema, minimally tend to palpation, no drainage, no warmth   Psychiatric: She has a normal mood and affect. Her behavior is normal.   Nursing note and vitals reviewed.    LABS  CT chest 10/17:  incidental 1.9cm posterior mediastinal soft tissue density, mpst likely schwannoma or neurofibroma    ASSESSMENT/PLAN  Problem List Items Addressed This Visit     Neurofibroma of thoracic region     Incidental 1.9cm soft tissue density on CT abdomen/pelvis in 10/17, suggestive of schwannoma versus neurofibroma; follow-up CT chest ordered for serial examination         Relevant Orders    CT Chest Without Contrast      Other Visit Diagnoses     Cutaneous abscess of right lower extremity    -  Primary    resolving, will finish 10d course of bactrim, keep covered and cont soaks; expect cont'd improvement and re-eval at upcoming wellness visit    Vaccine counseling        rec SHingrix, counseling done re: indication, s/e, risks; get at pharmacy and recommend documentation; flu vacc upcoming mos; Tdap 7/12          FOLLOW-UP  1. Health maintenance - rec Shingrix, counseling done, rec flu vaccine upcoming fall mos; Tdap 7/12  2. RTC for next wellness 11/9/18; fasting labs wk prior to appt (CBC, CMP, TSH, lipids, UA/micr); this would be good timing as well to f/u on right leg infection lesion    Electronically signed by:    Nicole Arana MD  08/17/2018

## 2018-08-22 ENCOUNTER — TELEPHONE (OUTPATIENT)
Dept: INTERNAL MEDICINE | Facility: CLINIC | Age: 78
End: 2018-08-22

## 2018-08-28 ENCOUNTER — TELEPHONE (OUTPATIENT)
Dept: INTERNAL MEDICINE | Facility: CLINIC | Age: 78
End: 2018-08-28

## 2018-08-28 NOTE — TELEPHONE ENCOUNTER
----- Message from Nicole Arana MD sent at 8/28/2018 12:11 AM EDT -----  Regarding: CT chest results  Chest CT shows stable nodule on the right side of the lung as compared to the CT in Oct 2017.  Appears benign.  No further workup needed at this time unless she develops symptoms down the road.

## 2018-09-22 ENCOUNTER — OFFICE VISIT (OUTPATIENT)
Dept: INTERNAL MEDICINE | Facility: CLINIC | Age: 78
End: 2018-09-22

## 2018-09-22 VITALS
DIASTOLIC BLOOD PRESSURE: 74 MMHG | WEIGHT: 131 LBS | BODY MASS INDEX: 21.05 KG/M2 | HEIGHT: 66 IN | OXYGEN SATURATION: 96 % | SYSTOLIC BLOOD PRESSURE: 134 MMHG | HEART RATE: 76 BPM

## 2018-09-22 DIAGNOSIS — L98.9 SKIN LESION: Primary | ICD-10-CM

## 2018-09-22 PROCEDURE — 99213 OFFICE O/P EST LOW 20 MIN: CPT | Performed by: NURSE PRACTITIONER

## 2018-09-22 RX ORDER — AZITHROMYCIN 250 MG/1
TABLET, FILM COATED ORAL
Qty: 6 TABLET | Refills: 0 | Status: SHIPPED | OUTPATIENT
Start: 2018-09-22 | End: 2018-10-02 | Stop reason: DRUGHIGH

## 2018-09-22 NOTE — PROGRESS NOTES
Chief Complaint   Patient presents with   • Wound Infection     x 6 weeks. Has been seen twice and has not gotten better. Now is painful.        History of Present Illness  77 y.o.female presents for wound infection.    Chronic leg wound 6 weeks has tried compression and wound soak with epsom salt.  Thinks maybe was a mosquito bite or ingrown hair to start and ripped the top with a razor.  Then looked like a boil with a hard top.  Seen our office 8-8 tx with bactrim 10 days.  Over the last 2 weeks has started to drain intermittently in the center, edges have become raised and is painful; sometimes look shiny on edges like small blisters.  No fever or chills.    Review of Systems   Constitutional: Negative for chills and fever.   Skin: Positive for color change and skin lesions.         [facility]  The following portions of the patient's history were reviewed and updated as appropriate: allergies, current medications, past family history, past medical history, past social history, past surgical history and problem list.     Past Medical History:   Diagnosis Date   • Dysphagia 02/04/2016    Impression: 02/04/2016 - Trial of omeprazole 40 mg daily. Refer for EGD d/t strong family history of CA, including GI CA.;    • Epigastric pain 02/04/2016    Impression: 02/04/2016 - Trial of omeprazole 40 mg daily. Refer for EGD.;    • Hx of bone density study 02/19/2014    nl DEXA (2/19/14), L 0.5, H -0.6; repeat 3-4 yrs   • Hx of bone density study 11/27/2017    nl DEXA (11/27/17) L0.3, H -0.4, repeat 3-4 yrs   • Hx of colonoscopy 02/27/2014    nl colonosc (2/27/14) except diverticulosis, repeat 10 yrs; CRS - Dr. Peoples   • Olecranon bursitis    • Pneumonia 02/07/2014    Impression: 02/07/2014 - lung exam unremarkable today; not unexpected to have some lingering fatigue; rec f/u CXR in 3-4 weeks for confirmation of clearance; strongly encouraged patient to update PVX; Description: RLL pneumonia by CXR (1/14)      Past Surgical History:  "  Procedure Laterality Date   • TOTAL ABDOMINAL HYSTERECTOMY WITH SALPINGO OOPHORECTOMY  2004    secondary to uterine prolapse and cervical dysplasia; GYN - Dr. Juarez      No Known Allergies   Family History   Problem Relation Age of Onset   • Cancer Mother         malignant appendiceal neoplasm   • Hodgkin's lymphoma Sister    • Kidney cancer Brother         RCC   • Heart attack Father    • Leukemia Father         AML   • Aortic aneurysm Father         AAA      Social History     Social History   • Marital status:      Spouse name: N/A   • Number of children: 2   • Years of education: N/A     Occupational History   • retired    • previous       Social History Main Topics   • Smoking status: Former Smoker     Quit date: 1960   • Smokeless tobacco: Never Used      Comment: h/p 4-5 years smoking   • Alcohol use Yes      Comment: 2-3 per week   • Drug use: No   • Sexual activity: Defer     Other Topics Concern   • Not on file     Social History Narrative    Exercises - gym, clogs, marva         Current Outpatient Prescriptions:   •  Calcium Carb-Cholecalciferol (CALCIUM 1000 + D PO), Take  by mouth. Plain calcium, Disp: , Rfl:   •  Cholecalciferol (VITAMIN D3) 5000 units capsule capsule, Take 5,000 Units by mouth Daily., Disp: , Rfl:   •  coenzyme Q10 100 MG capsule, Take 100 mg by mouth Daily., Disp: , Rfl:   •  Menaquinone-7 (VITAMIN K2 PO), Take  by mouth Daily. Has coQ10, Disp: , Rfl:     VITALS:  /74   Pulse 76   Ht 167 cm (65.75\")   Wt 59.4 kg (131 lb)   SpO2 96%   BMI 21.31 kg/m²   T 98.7  Physical Exam   Constitutional: She is oriented to person, place, and time. She appears well-developed and well-nourished. No distress.   Eyes: Pupils are equal, round, and reactive to light.   Cardiovascular: Normal rate.    Pulmonary/Chest: Effort normal.   Neurological: She is alert and oriented to person, place, and time.   Skin: Skin is warm and dry. Capillary refill takes less than 2 " seconds. Turgor is normal.   Right leg lesion located just lateral to mid shin; raised, firm, nonfluctuent, circular with irregular borders pink pearly edges no blisters with umbilicated scabbed center; no drainage or surrounding erythema or streaking. Tender to touch.       LABS  No new labs    ASSESSMENT/PLAN  Cathy was seen today for wound infection.    Diagnoses and all orders for this visit:    Skin lesion  -     Ambulatory Referral to Dermatology  -     azithromycin (ZITHROMAX) 250 MG tablet; Take 2 tablets the first day, then 1 tablet daily for 4 days.    Lesion may be a boil that has hardened into an abscess that might benefit from I&D.  The raised pink pearly border appearance with umbilicated center makes me slightly suspicious of abnormal skin pathology though.  Will treat with abx; she has bactroban cream at home that I instructed her to use TID.  Can use warm compresses but does not need to soak.  Will get a derm eval.      If developed fever, surrounding erythema or streaking around lesion would recommend return for reeval or emergency room treatment.    I discussed the patients findings and my recommendations with patient.     Patient was encouraged to keep me informed of any acute changes, lack of improvement, or any new concerning symptoms.    Patient voiced understanding of all instructions and denied further questions.      FOLLOW-UP  Return if symptoms worsen or fail to improve.    Electronically signed by:    GUMARO Knott  09/22/2018

## 2018-10-01 ENCOUNTER — TELEPHONE (OUTPATIENT)
Dept: INTERNAL MEDICINE | Facility: CLINIC | Age: 78
End: 2018-10-01

## 2018-10-01 NOTE — TELEPHONE ENCOUNTER
PT STATES THAT SHE DROPPED OFF INFORMATION ON HER WOUND CARE AND SHE HASN'T HEARD ANYTHING ABOUT IT AND WOULD LIKE TO GET A CALL BACK IN REGARDS TO THIS MATTER. PT SEES DR CARREON BUT SHE STATES THAT SHE SAW ASA KEITH AND SHE THOUGHT THAT IT WAS DROPPED OFF TO HER. PT CAN BE REACHED -[phone] -

## 2018-10-02 RX ORDER — CEFDINIR 300 MG/1
300 CAPSULE ORAL 2 TIMES DAILY
Qty: 20 CAPSULE | Refills: 0 | Status: SHIPPED | OUTPATIENT
Start: 2018-10-02 | End: 2018-10-12

## 2018-10-02 NOTE — TELEPHONE ENCOUNTER
Pt is returning your call.  She said she will be home the rest of the afternoon, so you can reach her at [phone]

## 2018-10-02 NOTE — TELEPHONE ENCOUNTER
Please see if there was a red top swab sent for wound culture last week on her.  I couldn't get any drainage in office but I had sent her a red top with swab home with instructions.  Pt says she dropped of the sample about a week ago. If so, I need the results please.  Thanks.

## 2018-10-02 NOTE — TELEPHONE ENCOUNTER
I returned call to pt.  Pt was able to get some drainage from right leg wound and did a red top swab and dropped off last Monday here for culture.  I have not seen any culture results.  I will follow up and get back with patient.

## 2018-10-02 NOTE — TELEPHONE ENCOUNTER
I returned call to pt; reviewed culture results and new abx sent in to pharmacy.  Pt verbalized understanding.

## 2018-10-02 NOTE — TELEPHONE ENCOUNTER
I reviewed culture results from 9-26 right leg lesion positive ecoli and prevotella species.  Will send her a cephlasporin abx.  I tried to call pt and LM to return call.  If she calls back go over results above and new RX sent to pharmacy. I still want her to go to dermatology.

## 2018-10-20 PROBLEM — IMO0002 SQUAMOUS CELL CARCINOMA: Status: ACTIVE | Noted: 2018-10-20

## 2018-11-02 ENCOUNTER — LAB (OUTPATIENT)
Dept: INTERNAL MEDICINE | Facility: CLINIC | Age: 78
End: 2018-11-02

## 2018-11-02 DIAGNOSIS — Z00.00 ANNUAL PHYSICAL EXAM: ICD-10-CM

## 2018-11-02 DIAGNOSIS — Z00.00 ANNUAL PHYSICAL EXAM: Primary | ICD-10-CM

## 2018-11-02 LAB
ALBUMIN SERPL-MCNC: 4.14 G/DL (ref 3.2–4.8)
ALBUMIN/GLOB SERPL: 1.9 G/DL (ref 1.5–2.5)
ALP SERPL-CCNC: 68 U/L (ref 25–100)
ALT SERPL W P-5'-P-CCNC: 16 U/L (ref 7–40)
ANION GAP SERPL CALCULATED.3IONS-SCNC: 2 MMOL/L (ref 3–11)
ARTICHOKE IGE QN: 113 MG/DL (ref 0–130)
AST SERPL-CCNC: 19 U/L (ref 0–33)
BILIRUB SERPL-MCNC: 0.5 MG/DL (ref 0.3–1.2)
BILIRUB UR QL STRIP: NEGATIVE
BUN BLD-MCNC: 10 MG/DL (ref 9–23)
BUN/CREAT SERPL: 16.7 (ref 7–25)
CALCIUM SPEC-SCNC: 9.3 MG/DL (ref 8.7–10.4)
CHLORIDE SERPL-SCNC: 103 MMOL/L (ref 99–109)
CHOLEST SERPL-MCNC: 184 MG/DL (ref 0–200)
CLARITY UR: CLEAR
CO2 SERPL-SCNC: 31 MMOL/L (ref 20–31)
COLOR UR: YELLOW
CREAT BLD-MCNC: 0.6 MG/DL (ref 0.6–1.3)
DEPRECATED RDW RBC AUTO: 41.3 FL (ref 37–54)
ERYTHROCYTE [DISTWIDTH] IN BLOOD BY AUTOMATED COUNT: 12.8 % (ref 11.3–14.5)
GFR SERPL CREATININE-BSD FRML MDRD: 97 ML/MIN/1.73
GLOBULIN UR ELPH-MCNC: 2.2 GM/DL
GLUCOSE BLD-MCNC: 88 MG/DL (ref 70–100)
GLUCOSE UR STRIP-MCNC: NEGATIVE MG/DL
HCT VFR BLD AUTO: 41.1 % (ref 34.5–44)
HDLC SERPL-MCNC: 67 MG/DL (ref 40–60)
HGB BLD-MCNC: 13.2 G/DL (ref 11.5–15.5)
HGB UR QL STRIP.AUTO: NEGATIVE
KETONES UR QL STRIP: NEGATIVE
LEUKOCYTE ESTERASE UR QL STRIP.AUTO: NEGATIVE
MCH RBC QN AUTO: 28.3 PG (ref 27–31)
MCHC RBC AUTO-ENTMCNC: 32.1 G/DL (ref 32–36)
MCV RBC AUTO: 88 FL (ref 80–99)
NITRITE UR QL STRIP: NEGATIVE
PH UR STRIP.AUTO: 8.5 [PH] (ref 5–8)
PLATELET # BLD AUTO: 330 10*3/MM3 (ref 150–450)
PMV BLD AUTO: 10.6 FL (ref 6–12)
POTASSIUM BLD-SCNC: 4.1 MMOL/L (ref 3.5–5.5)
PROT SERPL-MCNC: 6.3 G/DL (ref 5.7–8.2)
PROT UR QL STRIP: NEGATIVE
RBC # BLD AUTO: 4.67 10*6/MM3 (ref 3.89–5.14)
SODIUM BLD-SCNC: 136 MMOL/L (ref 132–146)
SP GR UR STRIP: 1.01 (ref 1–1.03)
TRIGL SERPL-MCNC: 68 MG/DL (ref 0–150)
TSH SERPL DL<=0.05 MIU/L-ACNC: 1.79 MIU/ML (ref 0.35–5.35)
UROBILINOGEN UR QL STRIP: ABNORMAL
WBC NRBC COR # BLD: 5.22 10*3/MM3 (ref 3.5–10.8)

## 2018-11-02 PROCEDURE — 81003 URINALYSIS AUTO W/O SCOPE: CPT | Performed by: INTERNAL MEDICINE

## 2018-11-02 PROCEDURE — 84443 ASSAY THYROID STIM HORMONE: CPT | Performed by: INTERNAL MEDICINE

## 2018-11-02 PROCEDURE — 80061 LIPID PANEL: CPT | Performed by: INTERNAL MEDICINE

## 2018-11-02 PROCEDURE — 80053 COMPREHEN METABOLIC PANEL: CPT | Performed by: INTERNAL MEDICINE

## 2018-11-02 PROCEDURE — 85027 COMPLETE CBC AUTOMATED: CPT | Performed by: INTERNAL MEDICINE

## 2018-11-09 ENCOUNTER — OFFICE VISIT (OUTPATIENT)
Dept: INTERNAL MEDICINE | Facility: CLINIC | Age: 78
End: 2018-11-09

## 2018-11-09 VITALS
SYSTOLIC BLOOD PRESSURE: 118 MMHG | OXYGEN SATURATION: 98 % | BODY MASS INDEX: 23.6 KG/M2 | DIASTOLIC BLOOD PRESSURE: 74 MMHG | HEART RATE: 74 BPM | HEIGHT: 63 IN | RESPIRATION RATE: 16 BRPM | WEIGHT: 133.2 LBS

## 2018-11-09 DIAGNOSIS — H61.21 RIGHT EAR IMPACTED CERUMEN: ICD-10-CM

## 2018-11-09 DIAGNOSIS — IMO0002 SQUAMOUS CELL CARCINOMA: ICD-10-CM

## 2018-11-09 DIAGNOSIS — Z00.00 MEDICARE ANNUAL WELLNESS VISIT, SUBSEQUENT: Primary | ICD-10-CM

## 2018-11-09 DIAGNOSIS — Z82.49 FAMILY HISTORY OF HEART DISEASE: ICD-10-CM

## 2018-11-09 PROCEDURE — 93000 ELECTROCARDIOGRAM COMPLETE: CPT | Performed by: INTERNAL MEDICINE

## 2018-11-09 PROCEDURE — G0444 DEPRESSION SCREEN ANNUAL: HCPCS | Performed by: INTERNAL MEDICINE

## 2018-11-09 PROCEDURE — G0439 PPPS, SUBSEQ VISIT: HCPCS | Performed by: INTERNAL MEDICINE

## 2018-11-09 NOTE — PROGRESS NOTES
ANNUAL WELLNESS VISIT    DRUG AND ALCOHOL USE   no tob use   alcohol intake:glass of wine with dinner    DIET AND PHYSICAL ACTIVITY     Diet: general    Exercise: infrequently   Exercise Details: walking    MOOD DISORDER AND COGNITIVE SCREENING   Depression Screening Tool Used yes - see PHQ-9   Anxiety Screening Tool Used yes     Mini-Cog Performed   Yes    1. Tell Patient 3 Words apple, danitza, table     2. Administer Clock Test abnormal    3. Recall 3 words  apple, danitza, table     4. Number Correct Items 3    FUNCTIONAL ABILITY AND LEVEL OF SAFETY   Hearing no hearing loss     Wears Hearing Aids No       Current Activities Independent      none  - see Funct/Cog Status Intake     Fall Risk Assessment       Has difficulty with walking or balance  No         Timed Up and Go (TUG) Test  4 sec.       If >12 sec, normal    ADVANCED DIRECTIVE has an advance directive - a copy HAS NOT been provided    PAIN SCREENING Do you have pain right now? yes      If so, 1-10 scale: 2     Intermittent     Do you have pain every day? No      Probable chronic pain: No     Recent Hospitalizations:  No recent hospitalization(s)..     MEDICATION REVIEW   - updated and reviewed (see Medication List).   - reviewed for potentially harmful drug-disease interactions in the elderly.   - reviewed for high risk medications in the elderly.   - aspirin use: No    BMI  Body mass index is 23.31 kg/m².    Patient's Body mass index is 23.31 kg/m². BMI is within normal parameters. No follow-up required.    _________________________________________________________  Chief Complaint   Patient presents with   • Medicare Wellness-subsequent       History of Present Illness  78 y.o.  woman presents for updated wellness visit.  Complains of stress and fatigue post-op from SCC right leg - extensive surgery requiring a flap.    Review of Systems  Denies headaches, visual changes, CP, palpitations, SOB, cough, abd pain, n/v/d, difficulty with  "urination, numbness/tingling, falls, mood changes, lightheadedness, hearing changes, rashes.     All other ROS reviewed and negative.    [facility]  The following portions of the patient's history were reviewed and updated as appropriate: allergies, current medications, past family history, past medical history, past social history, past surgical history and problem list.    Current Outpatient Prescriptions:   •  Calcium Carb-Cholecalciferol (CALCIUM 1000 + D PO), QD  •  Cholecalciferol (VITAMIN D3) 5000 units capsule DQ  •  coenzyme Q10 100 MG capsule, QD  •  Menaquinone-7 (VITAMIN K2 PO),QD    VITALS:  /74   Pulse 74   Resp 16   Ht 161 cm (63.39\")   Wt 60.4 kg (133 lb 3.2 oz)   SpO2 98%   BMI 23.31 kg/m²     Physical Exam   Constitutional: She is oriented to person, place, and time. She appears well-developed and well-nourished.   HENT:   Head: Normocephalic.   Right Ear: External ear normal.   Left Ear: External ear normal.   Nose: Nose normal.   Mouth/Throat: Oropharynx is clear and moist and mucous membranes are normal. No oropharyngeal exudate.   Right aud canal occluded by cerumen; finger rubbing hearing intact bilaterally   Eyes: Pupils are equal, round, and reactive to light. Conjunctivae and EOM are normal.   Wears glasses   Neck: Normal range of motion. Neck supple. Carotid bruit is not present (bilaterally). No thyromegaly present.   Cardiovascular: Normal rate, regular rhythm and normal heart sounds.    Pulmonary/Chest: Effort normal and breath sounds normal. No respiratory distress. She has no wheezes. She has no rales.   Abdominal: Soft. Bowel sounds are normal. She exhibits no distension and no mass. There is no hepatosplenomegaly. There is no tenderness.   Genitourinary:   Genitourinary Comments: Breast exam with diffuse fibrocystic changes, otherwise unremarkable without masses, skin changes, nipple discharge, or axillary adenopathy.     Musculoskeletal: Normal range of motion. She exhibits " no edema.   Lymphadenopathy:     She has no cervical adenopathy.   Neurological: She is alert and oriented to person, place, and time. She has normal reflexes. She displays normal reflexes. No cranial nerve deficit.   Skin: Skin is warm and dry. No rash noted.   Right ant shin with dressing s/p MOH's SCC (extensive)   Psychiatric: She has a normal mood and affect. Her behavior is normal.   Nursing note and vitals reviewed.      LABS  Results for orders placed or performed in visit on 11/02/18   CBC No Differential   Result Value Ref Range    WBC 5.22 3.50 - 10.80 10*3/mm3    RBC 4.67 3.89 - 5.14 10*6/mm3    Hemoglobin 13.2 11.5 - 15.5 g/dL    Hematocrit 41.1 34.5 - 44.0 %    MCV 88.0 80.0 - 99.0 fL    MCH 28.3 27.0 - 31.0 pg    MCHC 32.1 32.0 - 36.0 g/dL    RDW 12.8 11.3 - 14.5 %    RDW-SD 41.3 37.0 - 54.0 fl    MPV 10.6 6.0 - 12.0 fL    Platelets 330 150 - 450 10*3/mm3   Comprehensive metabolic panel   Result Value Ref Range    Glucose 88 70 - 100 mg/dL    BUN 10 9 - 23 mg/dL    Creatinine 0.60 0.60 - 1.30 mg/dL    Sodium 136 132 - 146 mmol/L    Potassium 4.1 3.5 - 5.5 mmol/L    Chloride 103 99 - 109 mmol/L    CO2 31.0 20.0 - 31.0 mmol/L    Calcium 9.3 8.7 - 10.4 mg/dL    Total Protein 6.3 5.7 - 8.2 g/dL    Albumin 4.14 3.20 - 4.80 g/dL    ALT (SGPT) 16 7 - 40 U/L    AST (SGOT) 19 0 - 33 U/L    Alkaline Phosphatase 68 25 - 100 U/L    Total Bilirubin 0.5 0.3 - 1.2 mg/dL    eGFR Non African Amer 97 >60 mL/min/1.73    Globulin 2.2 gm/dL    A/G Ratio 1.9 1.5 - 2.5 g/dL    BUN/Creatinine Ratio 16.7 7.0 - 25.0    Anion Gap 2.0 (L) 3.0 - 11.0 mmol/L   TSH   Result Value Ref Range    TSH 1.787 0.350 - 5.350 mIU/mL   Lipid panel   Result Value Ref Range    Total Cholesterol 184 0 - 200 mg/dL    Triglycerides 68 0 - 150 mg/dL    HDL Cholesterol 67 (H) 40 - 60 mg/dL    LDL Cholesterol  113 0 - 130 mg/dL   Urinalysis With Microscopic If Indicated (No Culture) - Urine, Clean Catch   Result Value Ref Range    Color, UA Yellow  Yellow, Straw    Appearance, UA Clear Clear    pH, UA 8.5 (H) 5.0 - 8.0    Specific Gravity, UA 1.007 1.001 - 1.030    Glucose, UA Negative Negative    Ketones, UA Negative Negative    Bilirubin, UA Negative Negative    Blood, UA Negative Negative    Protein, UA Negative Negative    Leuk Esterase, UA Negative Negative    Nitrite, UA Negative Negative    Urobilinogen, UA 0.2 E.U./dL 0.2 - 1.0 E.U./dL       ECG 12 Lead  Date/Time: 11/9/2018 10:10 AM  Performed by: OSBALDO ARANA  Authorized by: OSBALDO ARANA   Previous ECG: no previous ECG available  Rhythm: sinus rhythm  Rate: normal  BPM: 72  Conduction: conduction normal  ST Segments: ST segments normal  T Waves: T waves normal  QRS axis: normal  Other findings: PRWP  Clinical impression: non-specific ECG and low voltage          ASSESSMENT/PLAN  Problem List Items Addressed This Visit     Medicare annual wellness visit, subsequent - Primary     Health maintenance - flu vacc refused, Prevnar 2/16, PVX 11/17, tdap 7/12, rec hep A and shingrix - get at pharmacy; mammo 2/1/18 S[facility]; no further Paps unless abnlities; DEXA 11/17, repeat 2020-21; colonsoc 2/14, repeat 2024 per Dr. Peoples; eye exam with Dr. Francis to be updated; dental exam q6mos; (+) seat belt use; needs annual skin check with Dr. Baer (with h/o BCC and recent SCC)    Consultants:  Patient Care Team:  Osbaldo Arana MD as PCP - General (Internal Medicine)  Alexa Muro PA as PCP - Claims Attributed  Richelle, Surinder KRISHNAMURTHY MD as Consulting Physician (Colon and Rectal Surgery)  Fransico Juarez MD as Consulting Physician (Obstetrics and Gynecology)  Surinder Fontaine MD as Consulting Physician (Infectious Diseases)  Fransico Kimble MD as Consulting Physician (Dermatology)  Chay Francis III, MD as Consulting Physician (Ophthalmology)  Kimberley Baer MD as Consulting Physician (Dermatology)  Sid Infante MD as Consulting Physician (Dermatology)             Squamous cell carcinoma, right  lower leg     Encouraged leg elevation; local care as instructed by Dr. Donnelly - she has f/u 1/29/19; also discussed importance of sunscreen, sun wear; discussed importance of annual skin checks with Dr. Baer           Other Visit Diagnoses     Family history of heart disease        Relevant Orders    ECG 12 Lead    Right ear impacted cerumen        patient will use debrox at home herself to evacuate          FOLLOW-UP  RTC 1yr for annual wellness visit; fasting labs the week prior to appt (CBC, CMP, TSH, lipids, UA/micro)      Electronically signed by:    Nicole Arana MD  11/09/2018

## 2018-11-09 NOTE — ASSESSMENT & PLAN NOTE
Encouraged leg elevation; local care as instructed by Dr. Donnelly - she has f/u 1/29/19; also discussed importance of sunscreen, sun wear; discussed importance of annual skin checks with Dr. Baer

## 2019-10-22 ENCOUNTER — TELEPHONE (OUTPATIENT)
Dept: INTERNAL MEDICINE | Facility: CLINIC | Age: 79
End: 2019-10-22

## 2019-10-22 DIAGNOSIS — Z00.00 MEDICARE ANNUAL WELLNESS VISIT, SUBSEQUENT: Primary | ICD-10-CM

## 2019-11-08 ENCOUNTER — LAB (OUTPATIENT)
Dept: INTERNAL MEDICINE | Facility: CLINIC | Age: 79
End: 2019-11-08

## 2019-11-08 DIAGNOSIS — Z00.00 MEDICARE ANNUAL WELLNESS VISIT, SUBSEQUENT: ICD-10-CM

## 2019-11-08 LAB
ALBUMIN SERPL-MCNC: 4.5 G/DL (ref 3.5–5.2)
ALBUMIN/GLOB SERPL: 1.7 G/DL
ALP SERPL-CCNC: 67 U/L (ref 39–117)
ALT SERPL W P-5'-P-CCNC: 10 U/L (ref 1–33)
ANION GAP SERPL CALCULATED.3IONS-SCNC: 11.6 MMOL/L (ref 5–15)
AST SERPL-CCNC: 16 U/L (ref 1–32)
BACTERIA UR QL AUTO: NORMAL /HPF
BASOPHILS # BLD AUTO: 0.04 10*3/MM3 (ref 0–0.2)
BASOPHILS NFR BLD AUTO: 0.8 % (ref 0–1.5)
BILIRUB SERPL-MCNC: 0.4 MG/DL (ref 0.2–1.2)
BILIRUB UR QL STRIP: NEGATIVE
BUN BLD-MCNC: 11 MG/DL (ref 8–23)
BUN/CREAT SERPL: 17.5 (ref 7–25)
CALCIUM SPEC-SCNC: 9.7 MG/DL (ref 8.6–10.5)
CHLORIDE SERPL-SCNC: 104 MMOL/L (ref 98–107)
CHOLEST SERPL-MCNC: 202 MG/DL (ref 0–200)
CLARITY UR: CLEAR
CO2 SERPL-SCNC: 28.4 MMOL/L (ref 22–29)
COLOR UR: YELLOW
CREAT BLD-MCNC: 0.63 MG/DL (ref 0.57–1)
DEPRECATED RDW RBC AUTO: 37.5 FL (ref 37–54)
EOSINOPHIL # BLD AUTO: 0.13 10*3/MM3 (ref 0–0.4)
EOSINOPHIL NFR BLD AUTO: 2.6 % (ref 0.3–6.2)
ERYTHROCYTE [DISTWIDTH] IN BLOOD BY AUTOMATED COUNT: 12.1 % (ref 12.3–15.4)
GFR SERPL CREATININE-BSD FRML MDRD: 91 ML/MIN/1.73
GLOBULIN UR ELPH-MCNC: 2.7 GM/DL
GLUCOSE BLD-MCNC: 91 MG/DL (ref 65–99)
GLUCOSE UR STRIP-MCNC: NEGATIVE MG/DL
HCT VFR BLD AUTO: 39.8 % (ref 34–46.6)
HDLC SERPL-MCNC: 74 MG/DL (ref 40–60)
HGB BLD-MCNC: 13.6 G/DL (ref 12–15.9)
HGB UR QL STRIP.AUTO: NEGATIVE
HYALINE CASTS UR QL AUTO: NORMAL /LPF
IMM GRANULOCYTES # BLD AUTO: 0.03 10*3/MM3 (ref 0–0.05)
IMM GRANULOCYTES NFR BLD AUTO: 0.6 % (ref 0–0.5)
KETONES UR QL STRIP: NEGATIVE
LDLC SERPL CALC-MCNC: 119 MG/DL (ref 0–100)
LDLC/HDLC SERPL: 1.61 {RATIO}
LEUKOCYTE ESTERASE UR QL STRIP.AUTO: NEGATIVE
LYMPHOCYTES # BLD AUTO: 1.69 10*3/MM3 (ref 0.7–3.1)
LYMPHOCYTES NFR BLD AUTO: 34.2 % (ref 19.6–45.3)
MCH RBC QN AUTO: 29.5 PG (ref 26.6–33)
MCHC RBC AUTO-ENTMCNC: 34.2 G/DL (ref 31.5–35.7)
MCV RBC AUTO: 86.3 FL (ref 79–97)
MONOCYTES # BLD AUTO: 0.39 10*3/MM3 (ref 0.1–0.9)
MONOCYTES NFR BLD AUTO: 7.9 % (ref 5–12)
NEUTROPHILS # BLD AUTO: 2.66 10*3/MM3 (ref 1.7–7)
NEUTROPHILS NFR BLD AUTO: 53.9 % (ref 42.7–76)
NITRITE UR QL STRIP: NEGATIVE
NRBC BLD AUTO-RTO: 0 /100 WBC (ref 0–0.2)
PH UR STRIP.AUTO: 7.5 [PH] (ref 5–8)
PLATELET # BLD AUTO: 301 10*3/MM3 (ref 140–450)
PMV BLD AUTO: 10.4 FL (ref 6–12)
POTASSIUM BLD-SCNC: 4.4 MMOL/L (ref 3.5–5.2)
PROT SERPL-MCNC: 7.2 G/DL (ref 6–8.5)
PROT UR QL STRIP: NEGATIVE
RBC # BLD AUTO: 4.61 10*6/MM3 (ref 3.77–5.28)
RBC # UR: NORMAL /HPF
REF LAB TEST METHOD: NORMAL
SODIUM BLD-SCNC: 144 MMOL/L (ref 136–145)
SP GR UR STRIP: 1.01 (ref 1–1.03)
SQUAMOUS #/AREA URNS HPF: NORMAL /HPF
TRIGL SERPL-MCNC: 46 MG/DL (ref 0–150)
TSH SERPL DL<=0.05 MIU/L-ACNC: 1.65 UIU/ML (ref 0.27–4.2)
UROBILINOGEN UR QL STRIP: NORMAL
VLDLC SERPL-MCNC: 9.2 MG/DL (ref 5–40)
WBC NRBC COR # BLD: 4.94 10*3/MM3 (ref 3.4–10.8)
WBC UR QL AUTO: NORMAL /HPF

## 2019-11-08 PROCEDURE — 81001 URINALYSIS AUTO W/SCOPE: CPT | Performed by: INTERNAL MEDICINE

## 2019-11-08 PROCEDURE — 85025 COMPLETE CBC W/AUTO DIFF WBC: CPT | Performed by: INTERNAL MEDICINE

## 2019-11-08 PROCEDURE — 84443 ASSAY THYROID STIM HORMONE: CPT | Performed by: INTERNAL MEDICINE

## 2019-11-08 PROCEDURE — 80053 COMPREHEN METABOLIC PANEL: CPT | Performed by: INTERNAL MEDICINE

## 2019-11-08 PROCEDURE — 80061 LIPID PANEL: CPT | Performed by: INTERNAL MEDICINE

## 2019-11-10 NOTE — ASSESSMENT & PLAN NOTE
Health maintenance - flu vacc refused despite counseling, Prevnar 2/16, PVX 11/17, Tdap 7/12 (Td due 2022), rec HAV and shingrix vaccines - get at pharmacy; mammo 2/20/19 [facility]; no further Paps unless abnlities; DEXA 11/17, repeat 2020 [facility] colonosc 2/14, repeat 2024 per Dr. Peoples (by that time, may want to forego, counseling done re: screening recs, will discuss further when time comes; eye exam with Dr. Francis 11/19 (glasses); dental exam q6 mos; (+) seat belt use    Consultants:  Patient Care Team:  Nicole Arana MD as PCP - General (Internal Medicine)  Nicole Arana MD as PCP - Claims Attributed  Surinder Peoples MD as Consulting Physician (Colon and Rectal Surgery)  Fransico Juarez MD as Consulting Physician (Obstetrics and Gynecology)  Surinder Fontaine MD as Consulting Physician (Infectious Diseases)  Fransico Kimble MD as Consulting Physician (Dermatology)  Chay Francis III, MD as Consulting Physician (Ophthalmology)  Kimberley Baer MD as Consulting Physician (Dermatology)  Sid Infante MD as Consulting Physician (Dermatology)

## 2019-11-11 ENCOUNTER — OFFICE VISIT (OUTPATIENT)
Dept: INTERNAL MEDICINE | Facility: CLINIC | Age: 79
End: 2019-11-11

## 2019-11-11 VITALS
BODY MASS INDEX: 22.88 KG/M2 | DIASTOLIC BLOOD PRESSURE: 68 MMHG | HEART RATE: 69 BPM | HEIGHT: 64 IN | WEIGHT: 134 LBS | OXYGEN SATURATION: 98 % | SYSTOLIC BLOOD PRESSURE: 138 MMHG

## 2019-11-11 DIAGNOSIS — Z00.00 MEDICARE ANNUAL WELLNESS VISIT, SUBSEQUENT: Primary | ICD-10-CM

## 2019-11-11 DIAGNOSIS — H61.21 RIGHT EAR IMPACTED CERUMEN: ICD-10-CM

## 2019-11-11 PROCEDURE — G0439 PPPS, SUBSEQ VISIT: HCPCS | Performed by: INTERNAL MEDICINE

## 2019-11-11 PROCEDURE — G0444 DEPRESSION SCREEN ANNUAL: HCPCS | Performed by: INTERNAL MEDICINE

## 2019-11-11 PROCEDURE — 99497 ADVNCD CARE PLAN 30 MIN: CPT | Performed by: INTERNAL MEDICINE

## 2019-11-11 PROCEDURE — 69210 REMOVE IMPACTED EAR WAX UNI: CPT | Performed by: INTERNAL MEDICINE

## 2019-11-11 RX ORDER — CHLORAL HYDRATE 500 MG
CAPSULE ORAL
COMMUNITY

## 2019-11-11 NOTE — PROGRESS NOTES
ANNUAL WELLNESS VISIT    DRUG AND ALCOHOL USE      no tobacco use, alcohol intake:2-5 drinks per week and caffeine intake: 3 cups of caffeinated coffee per day    DIET AND PHYSICAL ACTIVITY     Diet: general    Exercise: frequently   Exercise Details: yoga, strength training    MOOD DISORDER AND COGNITIVE SCREENING   Depression Screening Tool Used yes - see PHQ-9; components reviewed with patient; 15-min counseling done -questionairre items reviewed with patient, noting several days of feeling down and depressed and having some issues with sleep.  Does not have difficulty in finding pleasure in doing things.  Does not complain of fatigue, appetite issues, feeling badly, or having difficulty with concentration and focus.  Notes  is 89 and has had a few health scares.  His mind is intact but most recently in October, he had a 14 mm kidney stone.  She thought he could have been dying.  He had a perforated bowel about 20 years ago.  Goes to [employer].  Doesn't like to leave him alone for too long periods of time.    Anxiety Screening Tool Used yes     Mini-Cog Performed   Yes    1. Tell Patient 3 Words apple table danitza    2. Administer Clock Test normal    3. Recall 3 words  apple table danitza    4. Number Correct Items 3    FUNCTIONAL ABILITY AND LEVEL OF SAFETY   Hearing no hearing loss     Wears Hearing Aids No       Current Activities Independent      none  - see Funct/Cog Status Intake     Fall Risk Assessment       Has difficulty with walking or balance  No         Timed Up and Go (TUG) Test  8 sec.       If >12 sec, normal    ADVANCED DIRECTIVE Patient does not have an advance directive - not interested in additional information; does not have any documentation re: end of life wishes;  in theory would decide for her; she was a nurse; wants to be saved but does not want extraordinary life-sustaining measures, including shocks, ventilator, feeding tube; does not want to exist in vegetative states;  promotes comfort care. Strongly recommend getting their wishes formally documented. States finances are documented and in order as well. 15 min counseling    PAIN SCREENING Do you have pain right now? no      If so, 1-10 scale: 0     Intermittent     Do you have pain every day? No      Probable chronic pain: No     Recent Hospitalizations:  No recent hospitalization(s)..     MEDICATION REVIEW   - updated and reviewed (see Medication List).   - reviewed for potentially harmful drug-disease interactions in the elderly.   - reviewed for high risk medications in the elderly.   - aspirin use: No    BMI  Body mass index is 23 kg/m².    Patient's Body mass index is 23 kg/m². BMI is within normal parameters. No follow-up required..    _________________________________________________________  Chief Complaint   Patient presents with   • Medicare Wellness-subsequent       History of Present Illness  79 y.o.  woman presents for updated wellness visit.  Feels well overall.  Most stressful part of life is now 88yo .  His mind is intact but health remains an issue.  Most recently had 14mm kidney stone; then could not urinate; seen by Dr. Francia Jr.     Review of Systems  Denies headaches, visual changes, CP, palpitations, SOB, cough, abd pain, n/v/d, difficulty with urination, numbness/tingling, falls, mood changes (stress, fear and sadness about the future with her  as noted above), lightheadedness, hearing changes, rashes.    Denies vaginal discharge or bleeding (no periods) or breast concerns.   All other ROS reviewed and negative.    [facility]  The following portions of the patient's history were reviewed and updated as appropriate: allergies, current medications, past family history, past medical history, past social history, past surgical history and problem list.  SH: has been  52 years    Current Outpatient Medications:   •  Calcium Carb-Cholecalciferol (CALCIUM 1000 + D PO), QD  •   "Cholecalciferol (VITAMIN D3) 5000 units capsule QD  •  coenzyme Q10 100 MG QD  •  Menaquinone-7 (VITAMIN K2 PO), QD Has coQ10  •  Omega-3 1000 MG QD    VITALS:  /68   Pulse 69   Ht 162.6 cm (64\")   Wt 60.8 kg (134 lb)   SpO2 98%   BMI 23.00 kg/m²     Physical Exam   Constitutional: She is oriented to person, place, and time. She appears well-developed and well-nourished.   HENT:   Head: Normocephalic.   Left Ear: External ear normal.   Nose: Nose normal.   Mouth/Throat: Oropharynx is clear and moist and mucous membranes are normal. No oropharyngeal exudate.   Right auditory canal occluded by cerumen   Eyes: Conjunctivae and EOM are normal. Pupils are equal, round, and reactive to light.   Wears glasses   Neck: Normal range of motion. Neck supple. Carotid bruit is not present (bilaterally). No thyromegaly present.   Cardiovascular: Normal rate, regular rhythm and normal heart sounds.   Pulmonary/Chest: Effort normal and breath sounds normal. No respiratory distress. She has no wheezes. She has no rales. She exhibits no tenderness.   Abdominal: Soft. Bowel sounds are normal. She exhibits no distension and no mass. There is no hepatosplenomegaly. There is no tenderness.   Genitourinary:   Genitourinary Comments: Breast exam with diffuse fibrocystic changes bilaterlaly unremarkable without masses, skin changes, nipple discharge, or axillary adenopathy.     Musculoskeletal: Normal range of motion. 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 She exhibits no edema (BLE).   Lymphadenopathy:     She has no cervical adenopathy.   Neurological: She is alert and oriented to person, place, and time. She displays normal reflexes. No cranial nerve deficit.   Skin: Skin is warm and dry. No rash noted.   Psychiatric: She has a normal mood and affect. Her behavior is normal.   Nursing note and vitals reviewed.      LABS  Results for orders placed or performed in " visit on 11/08/19   Comprehensive Metabolic Panel   Result Value Ref Range    Glucose 91 65 - 99 mg/dL    BUN 11 8 - 23 mg/dL    Creatinine 0.63 0.57 - 1.00 mg/dL    Sodium 144 136 - 145 mmol/L    Potassium 4.4 3.5 - 5.2 mmol/L    Chloride 104 98 - 107 mmol/L    CO2 28.4 22.0 - 29.0 mmol/L    Calcium 9.7 8.6 - 10.5 mg/dL    Total Protein 7.2 6.0 - 8.5 g/dL    Albumin 4.50 3.50 - 5.20 g/dL    ALT (SGPT) 10 1 - 33 U/L    AST (SGOT) 16 1 - 32 U/L    Alkaline Phosphatase 67 39 - 117 U/L    Total Bilirubin 0.4 0.2 - 1.2 mg/dL    eGFR Non African Amer 91 >60 mL/min/1.73    Globulin 2.7 gm/dL    A/G Ratio 1.7 g/dL    BUN/Creatinine Ratio 17.5 7.0 - 25.0    Anion Gap 11.6 5.0 - 15.0 mmol/L   Lipid Panel   Result Value Ref Range    Total Cholesterol 202 (H) 0 - 200 mg/dL    Triglycerides 46 0 - 150 mg/dL    HDL Cholesterol 74 (H) 40 - 60 mg/dL    LDL Cholesterol  119 (H) 0 - 100 mg/dL    VLDL Cholesterol 9.2 5 - 40 mg/dL    LDL/HDL Ratio 1.61    TSH   Result Value Ref Range    TSH 1.650 0.270 - 4.200 uIU/mL   CBC Auto Differential   Result Value Ref Range    WBC 4.94 3.40 - 10.80 10*3/mm3    RBC 4.61 3.77 - 5.28 10*6/mm3    Hemoglobin 13.6 12.0 - 15.9 g/dL    Hematocrit 39.8 34.0 - 46.6 %    MCV 86.3 79.0 - 97.0 fL    MCH 29.5 26.6 - 33.0 pg    MCHC 34.2 31.5 - 35.7 g/dL    RDW 12.1 (L) 12.3 - 15.4 %    RDW-SD 37.5 37.0 - 54.0 fl    MPV 10.4 6.0 - 12.0 fL    Platelets 301 140 - 450 10*3/mm3    Neutrophil % 53.9 42.7 - 76.0 %    Lymphocyte % 34.2 19.6 - 45.3 %    Monocyte % 7.9 5.0 - 12.0 %    Eosinophil % 2.6 0.3 - 6.2 %    Basophil % 0.8 0.0 - 1.5 %    Immature Grans % 0.6 (H) 0.0 - 0.5 %    Neutrophils, Absolute 2.66 1.70 - 7.00 10*3/mm3    Lymphocytes, Absolute 1.69 0.70 - 3.10 10*3/mm3    Monocytes, Absolute 0.39 0.10 - 0.90 10*3/mm3    Eosinophils, Absolute 0.13 0.00 - 0.40 10*3/mm3    Basophils, Absolute 0.04 0.00 - 0.20 10*3/mm3    Immature Grans, Absolute 0.03 0.00 - 0.05 10*3/mm3    nRBC 0.0 0.0 - 0.2 /100 WBC    Urinalysis without microscopic (no culture) - Urine, Clean Catch   Result Value Ref Range    Color, UA Yellow Yellow, Straw    Appearance, UA Clear Clear    pH, UA 7.5 5.0 - 8.0    Specific Gravity, UA 1.007 1.005 - 1.030    Glucose, UA Negative Negative    Ketones, UA Negative Negative    Bilirubin, UA Negative Negative    Blood, UA Negative Negative    Protein, UA Negative Negative    Leuk Esterase, UA Negative Negative    Nitrite, UA Negative Negative    Urobilinogen, UA 0.2 E.U./dL 0.2 - 1.0 E.U./dL   Urinalysis, Microscopic Only - Urine, Clean Catch   Result Value Ref Range    RBC, UA 0-2 None Seen, 0-2 /HPF    WBC, UA 0-2 None Seen, 0-2 /HPF    Bacteria, UA None Seen None Seen /HPF    Squamous Epithelial Cells, UA 0-2 None Seen, 0-2 /HPF    Hyaline Casts, UA None Seen None Seen /LPF    Methodology Automated Microscopy        ASSESSMENT/PLAN  Problem List Items Addressed This Visit     Medicare annual wellness visit, subsequent - Primary     Health maintenance - flu vacc refused despite counseling, Prevnar 2/16, PVX 11/17, Tdap 7/12 (Td due 2022), rec HAV and shingrix vaccines - get at pharmacy; mammo 2/20/19 SJJ; no further Paps unless abnlities; DEXA 11/17, repeat 2020 ([facility]); colonosc 2/14, repeat 2024 per Dr. Peoples (by that time, may want to forego, counseling done re: screening recs, will discuss further when time comes; eye exam with Dr. Francis 11/19 (glasses); dental exam q6 mos; (+) seat belt use    Consultants:  Patient Care Team:  Nicole Arnaa MD as PCP - General (Internal Medicine)  Nicole Arana MD as PCP - Claims Attributed  Richelle, Surinder KRISHNAMURTHY MD as Consulting Physician (Colon and Rectal Surgery)  Fransico Juarez MD as Consulting Physician (Obstetrics and Gynecology)  Surinder Fontaine MD as Consulting Physician (Infectious Diseases)  Fransico Kimble MD as Consulting Physician (Dermatology)  Chay Francis III, MD as Consulting Physician  (Ophthalmology)  Kimberley Baer MD as Consulting Physician (Dermatology)  Sid Infante MD as Consulting Physician (Dermatology)               Other Visit Diagnoses     Right ear impacted cerumen        s/p lavage with water pick and instrumentation with ear scoop in the office today; patient tolerated procedure well, no complications      Time Documentation  Counseled patient  I spent 45 minutes minutes face to face with the patient and > 50 % of the time was spent counseling, coordinating care, answering questions, and discussing evaluation and treatment plans.  Counseling topics: diagnosis, lab results and counseling re: home life with , mood, and end of life considerations    FOLLOW-UP  RTC 1yr for annual wellness; fasting labs the week prior to appt (CBC, CMP, TSH, lipids, UA/micro)      Electronically signed by:    Nicole Arana MD  11/11/2019

## 2020-04-21 ENCOUNTER — OFFICE VISIT (OUTPATIENT)
Dept: INTERNAL MEDICINE | Facility: CLINIC | Age: 80
End: 2020-04-21

## 2020-04-21 ENCOUNTER — APPOINTMENT (OUTPATIENT)
Dept: LAB | Facility: HOSPITAL | Age: 80
End: 2020-04-21

## 2020-04-21 VITALS
BODY MASS INDEX: 22.61 KG/M2 | DIASTOLIC BLOOD PRESSURE: 68 MMHG | SYSTOLIC BLOOD PRESSURE: 120 MMHG | WEIGHT: 132.4 LBS | HEIGHT: 64 IN | OXYGEN SATURATION: 98 % | HEART RATE: 65 BPM | TEMPERATURE: 97.7 F

## 2020-04-21 DIAGNOSIS — L03.116 CELLULITIS OF LEFT LOWER EXTREMITY: Primary | ICD-10-CM

## 2020-04-21 PROCEDURE — 99213 OFFICE O/P EST LOW 20 MIN: CPT | Performed by: NURSE PRACTITIONER

## 2020-04-21 PROCEDURE — 87147 CULTURE TYPE IMMUNOLOGIC: CPT | Performed by: NURSE PRACTITIONER

## 2020-04-21 PROCEDURE — 87186 SC STD MICRODIL/AGAR DIL: CPT | Performed by: NURSE PRACTITIONER

## 2020-04-21 PROCEDURE — 87070 CULTURE OTHR SPECIMN AEROBIC: CPT | Performed by: NURSE PRACTITIONER

## 2020-04-21 PROCEDURE — 87205 SMEAR GRAM STAIN: CPT | Performed by: NURSE PRACTITIONER

## 2020-04-21 RX ORDER — CEPHALEXIN 500 MG/1
500 CAPSULE ORAL 2 TIMES DAILY
Qty: 20 CAPSULE | Refills: 0 | Status: SHIPPED | OUTPATIENT
Start: 2020-04-21 | End: 2020-05-01

## 2020-04-21 NOTE — PROGRESS NOTES
Chief Complaint   Patient presents with   • Sore     L Leg x 3 days       History of Present Illness    Cathy Crockett is a 79 y.o. female who presents today for suspected cellulitis.  Patient reports left leg wound occurred 6 to 7 days ago after she hit her left anterior leg with a rake while working in her yard.  Initially cleansed wound with peroxide and water and has been applying Neosporin and covering with bandage.  She has been cleaning wound daily with soap and water in the shower.  Started noticing increased redness, swelling, and bloody drainage mixed with pus yesterday.  Has had a cough and headache secondary to seasonal allergies after having worked outside for an extended period.  She has experienced sweats 1 night but reports no fevers at home. She additionally denies chills, body aches, NVD. Reports last tetanus 2011.    [facility]    The following portions of the patient's history were reviewed and updated as appropriate: allergies, current medications, past family history, past medical history, past social history, past surgical history and problem list.     Social History     Tobacco Use   • Smoking status: Former Smoker     Last attempt to quit: 1960     Years since quittin.3   • Smokeless tobacco: Never Used   • Tobacco comment: h/p 4-5 years smoking   Substance Use Topics   • Alcohol use: Yes     Comment: 2-3 per week       Past Medical History:   Diagnosis Date   • Dysphagia 2016    Impression: 2016 - Trial of omeprazole 40 mg daily. Refer for EGD d/t strong family history of CA, including GI CA.;    • Epigastric pain 2016    Impression: 2016 - Trial of omeprazole 40 mg daily. Refer for EGD.;    • Hx of bone density study 2014    nl DEXA (14), L 0.5, H -0.6; repeat 3-4 yrs   • Hx of bone density study 2017    nl DEXA (17) L0.3, H -0.4, repeat 3-4 yrs   • Hx of colonoscopy 2014    nl colonosc (14) except diverticulosis, repeat 10  yrs; [facility] - Dr. 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 Donnelly   • TOTAL ABDOMINAL HYSTERECTOMY WITH SALPINGO OOPHORECTOMY  2004    secondary to uterine prolapse and cervical dysplasia; GYN - Dr. Juarez       Family History   Problem Relation Age of Onset   • Cancer Mother         malignant appendiceal neoplasm   • Hodgkin's lymphoma Sister    • Kidney cancer Brother         RCC   • Heart attack Father    • Leukemia Father         AML   • Aortic aneurysm Father         AAA       No Known Allergies      Current Outpatient Medications:   •  Calcium Carb-Cholecalciferol (CALCIUM 1000 + D PO), Take  by mouth. Plain calcium, Disp: , Rfl:   •  Cholecalciferol (VITAMIN D3) 5000 units capsule capsule, Take 5,000 Units by mouth Daily., Disp: , Rfl:   •  coenzyme Q10 100 MG capsule, Take 100 mg by mouth Daily., Disp: , Rfl:   •  Menaquinone-7 (VITAMIN K2 PO), Take  by mouth Daily. Has coQ10, Disp: , Rfl:   •  Omega-3 1000 MG capsule, Take  by mouth., Disp: , Rfl:   •  cephalexin (KEFLEX) 500 MG capsule, Take 1 capsule by mouth 2 (Two) Times a Day for 10 days., Disp: 20 capsule, Rfl: 0    Review of Systems  Review of Systems   Constitutional: Positive for diaphoresis. Negative for appetite change, chills, fatigue and fever.   HENT: Negative for sore throat.    Eyes: Negative for visual disturbance.   Respiratory: Positive for cough (allergy related). Negative for shortness of breath.    Cardiovascular: Negative for chest pain and palpitations.   Gastrointestinal: Negative for abdominal pain, change in bowel habit,  "diarrhea, nausea and vomiting.   Musculoskeletal: Negative for arthralgias, gait problem, joint swelling and myalgias.   Skin: Positive for color change and wound. Negative for rash.   Neurological: Positive for headaches. Negative for dizziness, weakness, light-headedness and numbness.   Psychiatric/Behavioral: Positive for sleep disturbance (due to leg discomfort.).       Vitals:  Vitals:    04/21/20 1110   BP: 120/68   Pulse: 65   Temp: 97.7 °F (36.5 °C)   SpO2: 98%   Weight: 60.1 kg (132 lb 6.4 oz)   Height: 162.6 cm (64\")   PainSc:   2   PainLoc: Leg       Physical Exam  Physical Exam   Constitutional: She is oriented to person, place, and time. Vital signs are normal. She appears well-developed and well-nourished.  Non-toxic appearance. She does not have a sickly appearance. She does not appear ill.   Eyes: Pupils are equal, round, and reactive to light. Conjunctivae are normal.   Cardiovascular: Normal rate, regular rhythm and normal heart sounds.   Pulmonary/Chest: Effort normal and breath sounds normal. No respiratory distress. She has no decreased breath sounds. She has no wheezes. She has no rhonchi. She has no rales.   Musculoskeletal:   Ambulates without assistance, no noticeable impaired gait.  Able to bear full weight on left lower extremity.   Neurological: She is alert and oriented to person, place, and time.   Skin: Skin is warm and dry.        Open wound to left anterior lower leg approximately 1.5 cm in diameter.  There is erythema and edema of surrounding tissues and tenderness to palpation.  There is scant serosanguineous drainage noted, there is no purulent drainage noted.   Psychiatric: She has a normal mood and affect. Her behavior is normal.   Nursing note and vitals reviewed.      Labs  Per visit orders    Assessment/Plan    Catyh was seen today for sore.    Diagnoses and all orders for this visit:    Cellulitis of left lower extremity  -     cephalexin (KEFLEX) 500 MG capsule; Take 1 " capsule by mouth 2 (Two) Times a Day for 10 days.  -     Wound Culture - Wound, Leg, Left    Wound culture obtained, will notify results when received. Patient started on cephalexin to treat cellulitis, will adjust as necessary according to sensitivity results.  Advised to keep wound cleaned 2-3x daily with mild antibacterial soap and water, pat dry, apply topical antibiotic ointment and cover with sterile bandage.  Patient advised to monitor for new or worsening signs or symptoms of infection including increased erythema, edema, or drainage of wound and surrounding tissues and/or systemic symptoms including fevers, chills, diaphoresis, body aches, or NVD.  Advised follow-up in office if worsening or no improvement as expected.    Plan of care reviewed with patient at conclusion of today's visit. Patient education was provided regarding diagnosis, management, and prescribed or recommended OTC medications. Patient was informed to notify office of any new, worsening, or persistent symptoms. Patient verbalized understanding and agreement with plan of care.     Follow-Up  Return if symptoms worsen or fail to improve.      Electronically Signed By:  GUMARO Ireland      EMR Dragon/Transcription Disclaimer:  Please note that portions of this encounter note were completed using electronic transcription/translation of spoken language to printed text.  The electronic transcription/translation of spoken language may permit erroneous, or at times, nonsensical words or phrases to be inadvertently transcribed.  Although I have reviewed the note for such errors, some may still exist in this documentation.

## 2020-04-21 NOTE — PATIENT INSTRUCTIONS
Cellulitis, Adult    Cellulitis is a skin infection. The infected area is often warm, red, swollen, and sore. It occurs most often in the arms and lower legs. It is very important to get treated for this condition.  What are the causes?  This condition is caused by bacteria. The bacteria enter through a break in the skin, such as a cut, burn, insect bite, open sore, or crack.  What increases the risk?  This condition is more likely to occur in people who:  · Have a weak body defense system (immune system).  · Have open cuts, burns, bites, or scrapes on the skin.  · Are older than 60 years of age.  · Have a blood sugar problem (diabetes).  · Have a long-lasting (chronic) liver disease (cirrhosis) or kidney disease.  · Are very overweight (obese).  · Have a skin problem, such as:  ? Itchy rash (eczema).  ? Slow movement of blood in the veins (venous stasis).  ? Fluid buildup below the skin (edema).  · Have been treated with high-energy rays (radiation).  · Use IV drugs.  What are the signs or symptoms?  Symptoms of this condition include:  · Skin that is:  ? Red.  ? Streaking.  ? Spotting.  ? Swollen.  ? Sore or painful when you touch it.  ? Warm.  · A fever.  · Chills.  · Blisters.  How is this diagnosed?  This condition is diagnosed based on:  · Medical history.  · Physical exam.  · Blood tests.  · Imaging tests.  How is this treated?  Treatment for this condition may include:  · Medicines to treat infections or allergies.  · Home care, such as:  ? Rest.  ? Placing cold or warm cloths (compresses) on the skin.  · Hospital care, if the condition is very bad.  Follow these instructions at home:  Medicines  · Take over-the-counter and prescription medicines only as told by your doctor.  · If you were prescribed an antibiotic medicine, take it as told by your doctor. Do not stop taking it even if you start to feel better.  General instructions    · Drink enough fluid to keep your pee (urine) pale yellow.  · Do not touch  or rub the infected area.  · Raise (elevate) the infected area above the level of your heart while you are sitting or lying down.  · Place cold or warm cloths on the area as told by your doctor.  · Keep all follow-up visits as told by your doctor. This is important.  Contact a doctor if:  · You have a fever.  · You do not start to get better after 1-2 days of treatment.  · Your bone or joint under the infected area starts to hurt after the skin has healed.  · Your infection comes back. This can happen in the same area or another area.  · You have a swollen bump in the area.  · You have new symptoms.  · You feel ill and have muscle aches and pains.  Get help right away if:  · Your symptoms get worse.  · You feel very sleepy.  · You throw up (vomit) or have watery poop (diarrhea) for a long time.  · You see red streaks coming from the area.  · Your red area gets larger.  · Your red area turns dark in color.  These symptoms may represent a serious problem that is an emergency. Do not wait to see if the symptoms will go away. Get medical help right away. Call your local emergency services (911 in the [place] Do not drive yourself to the hospital.  Summary  · Cellulitis is a skin infection. The area is often warm, red, swollen, and sore.  · This condition is treated with medicines, rest, and cold and warm cloths.  · Take all medicines only as told by your doctor.  · Tell your doctor if symptoms do not start to get better after 1-2 days of treatment.  This information is not intended to replace advice given to you by your health care provider. Make sure you discuss any questions you have with your health care provider.  Document Released: 06/05/2009 Document Revised: 05/09/2019 Document Reviewed: 05/09/2019  [employer] Patient Education © 2020 [employer]

## 2020-04-23 LAB
BACTERIA SPEC AEROBE CULT: ABNORMAL
GRAM STN SPEC: ABNORMAL
GRAM STN SPEC: ABNORMAL

## 2020-11-05 ENCOUNTER — TELEPHONE (OUTPATIENT)
Dept: INTERNAL MEDICINE | Facility: CLINIC | Age: 80
End: 2020-11-05

## 2020-11-05 DIAGNOSIS — Z00.00 MEDICARE ANNUAL WELLNESS VISIT, SUBSEQUENT: Primary | ICD-10-CM

## 2020-11-05 NOTE — TELEPHONE ENCOUNTER
PATIENT WANTED TO KNOW IF SHE NEEDED TO GET LABS DONE BEFORE HER APPOINTMENT NEXT WEEK     PLEASE CALL AND ADVISE [phone] (H)

## 2020-11-09 ENCOUNTER — LAB (OUTPATIENT)
Dept: LAB | Facility: HOSPITAL | Age: 80
End: 2020-11-09

## 2020-11-09 DIAGNOSIS — Z00.00 MEDICARE ANNUAL WELLNESS VISIT, SUBSEQUENT: ICD-10-CM

## 2020-11-09 LAB
ALBUMIN SERPL-MCNC: 4.4 G/DL (ref 3.5–5.2)
ALBUMIN/GLOB SERPL: 2 G/DL
ALP SERPL-CCNC: 60 U/L (ref 39–117)
ALT SERPL W P-5'-P-CCNC: 14 U/L (ref 1–33)
ANION GAP SERPL CALCULATED.3IONS-SCNC: 12.2 MMOL/L (ref 5–15)
AST SERPL-CCNC: 20 U/L (ref 1–32)
BACTERIA UR QL AUTO: NORMAL /HPF
BASOPHILS # BLD AUTO: 0.04 10*3/MM3 (ref 0–0.2)
BASOPHILS NFR BLD AUTO: 0.8 % (ref 0–1.5)
BILIRUB SERPL-MCNC: 0.4 MG/DL (ref 0–1.2)
BILIRUB UR QL STRIP: NEGATIVE
BUN SERPL-MCNC: 11 MG/DL (ref 8–23)
BUN/CREAT SERPL: 20.4 (ref 7–25)
CALCIUM SPEC-SCNC: 9.2 MG/DL (ref 8.6–10.5)
CHLORIDE SERPL-SCNC: 102 MMOL/L (ref 98–107)
CHOLEST SERPL-MCNC: 182 MG/DL (ref 0–200)
CLARITY UR: CLEAR
CO2 SERPL-SCNC: 25.8 MMOL/L (ref 22–29)
COLOR UR: YELLOW
CREAT SERPL-MCNC: 0.54 MG/DL (ref 0.57–1)
DEPRECATED RDW RBC AUTO: 37.7 FL (ref 37–54)
EOSINOPHIL # BLD AUTO: 0.18 10*3/MM3 (ref 0–0.4)
EOSINOPHIL NFR BLD AUTO: 3.6 % (ref 0.3–6.2)
ERYTHROCYTE [DISTWIDTH] IN BLOOD BY AUTOMATED COUNT: 12.1 % (ref 12.3–15.4)
GFR SERPL CREATININE-BSD FRML MDRD: 109 ML/MIN/1.73
GLOBULIN UR ELPH-MCNC: 2.2 GM/DL
GLUCOSE SERPL-MCNC: 91 MG/DL (ref 65–99)
GLUCOSE UR STRIP-MCNC: NEGATIVE MG/DL
HCT VFR BLD AUTO: 37.1 % (ref 34–46.6)
HCV AB SER DONR QL: NORMAL
HDLC SERPL-MCNC: 72 MG/DL (ref 40–60)
HGB BLD-MCNC: 12.7 G/DL (ref 12–15.9)
HGB UR QL STRIP.AUTO: NEGATIVE
HYALINE CASTS UR QL AUTO: NORMAL /LPF
IMM GRANULOCYTES # BLD AUTO: 0.01 10*3/MM3 (ref 0–0.05)
IMM GRANULOCYTES NFR BLD AUTO: 0.2 % (ref 0–0.5)
KETONES UR QL STRIP: NEGATIVE
LDLC SERPL CALC-MCNC: 101 MG/DL (ref 0–100)
LDLC/HDLC SERPL: 1.41 {RATIO}
LEUKOCYTE ESTERASE UR QL STRIP.AUTO: NEGATIVE
LYMPHOCYTES # BLD AUTO: 1.56 10*3/MM3 (ref 0.7–3.1)
LYMPHOCYTES NFR BLD AUTO: 31.6 % (ref 19.6–45.3)
MCH RBC QN AUTO: 29.3 PG (ref 26.6–33)
MCHC RBC AUTO-ENTMCNC: 34.2 G/DL (ref 31.5–35.7)
MCV RBC AUTO: 85.5 FL (ref 79–97)
MONOCYTES # BLD AUTO: 0.49 10*3/MM3 (ref 0.1–0.9)
MONOCYTES NFR BLD AUTO: 9.9 % (ref 5–12)
NEUTROPHILS NFR BLD AUTO: 2.66 10*3/MM3 (ref 1.7–7)
NEUTROPHILS NFR BLD AUTO: 53.9 % (ref 42.7–76)
NITRITE UR QL STRIP: NEGATIVE
NRBC BLD AUTO-RTO: 0 /100 WBC (ref 0–0.2)
PH UR STRIP.AUTO: 8 [PH] (ref 5–8)
PLATELET # BLD AUTO: 299 10*3/MM3 (ref 140–450)
PMV BLD AUTO: 10 FL (ref 6–12)
POTASSIUM SERPL-SCNC: 4.8 MMOL/L (ref 3.5–5.2)
PROT SERPL-MCNC: 6.6 G/DL (ref 6–8.5)
PROT UR QL STRIP: NEGATIVE
RBC # BLD AUTO: 4.34 10*6/MM3 (ref 3.77–5.28)
RBC # UR: NORMAL /HPF
REF LAB TEST METHOD: NORMAL
SODIUM SERPL-SCNC: 140 MMOL/L (ref 136–145)
SP GR UR STRIP: 1.01 (ref 1–1.03)
SQUAMOUS #/AREA URNS HPF: NORMAL /HPF
TRIGL SERPL-MCNC: 42 MG/DL (ref 0–150)
TSH SERPL DL<=0.05 MIU/L-ACNC: 1.53 UIU/ML (ref 0.27–4.2)
UROBILINOGEN UR QL STRIP: NORMAL
VLDLC SERPL-MCNC: 9 MG/DL (ref 5–40)
WBC # BLD AUTO: 4.94 10*3/MM3 (ref 3.4–10.8)
WBC UR QL AUTO: NORMAL /HPF

## 2020-11-09 PROCEDURE — 80061 LIPID PANEL: CPT | Performed by: INTERNAL MEDICINE

## 2020-11-09 PROCEDURE — 81001 URINALYSIS AUTO W/SCOPE: CPT | Performed by: INTERNAL MEDICINE

## 2020-11-09 PROCEDURE — 86803 HEPATITIS C AB TEST: CPT | Performed by: INTERNAL MEDICINE

## 2020-11-09 PROCEDURE — 85025 COMPLETE CBC W/AUTO DIFF WBC: CPT | Performed by: INTERNAL MEDICINE

## 2020-11-09 PROCEDURE — 80053 COMPREHEN METABOLIC PANEL: CPT | Performed by: INTERNAL MEDICINE

## 2020-11-09 PROCEDURE — 84443 ASSAY THYROID STIM HORMONE: CPT | Performed by: INTERNAL MEDICINE

## 2020-11-12 NOTE — ASSESSMENT & PLAN NOTE
Health maintenance - flu vacc refused despite counseling (states  is adamant against flu vacc), Prevnar 2/16, PVX 11/17, Tdap 7/12 (next Td due 2022), rec HAV and shingrix - counseling given, rec getting at pharmacy; mammo 2/21/20 [facility]; no further Paps unless abnlities; DEXA ordered (last 11/17); colonosc 2/14, repeat 2024 per Dr. Peoples; eye exam with Dr. Francis pending 12/20; dental exam q6 mos; (+) seat belt use    Consultants:  Patient Care Team:  Nicole Arana MD as PCP - General (Internal Medicine)  Richelle, Surinder KRISHNAMURTHY MD as Consulting Physician (Colon and Rectal Surgery)  Fransico Juarez MD as Consulting Physician (Obstetrics and Gynecology)  Surinder Fontaine MD as Consulting Physician (Infectious Diseases)  Fransico Kimble MD as Consulting Physician (Dermatology)  Chay Francis III, MD as Consulting Physician (Ophthalmology)  Kimberley Baer MD as Consulting Physician (Dermatology)  Sid Infante MD as Consulting Physician (Dermatology)

## 2020-11-13 ENCOUNTER — OFFICE VISIT (OUTPATIENT)
Dept: INTERNAL MEDICINE | Facility: CLINIC | Age: 80
End: 2020-11-13

## 2020-11-13 VITALS
BODY MASS INDEX: 22.36 KG/M2 | DIASTOLIC BLOOD PRESSURE: 64 MMHG | HEART RATE: 61 BPM | SYSTOLIC BLOOD PRESSURE: 128 MMHG | WEIGHT: 131 LBS | OXYGEN SATURATION: 98 % | HEIGHT: 64 IN

## 2020-11-13 DIAGNOSIS — Z78.0 MENOPAUSE: ICD-10-CM

## 2020-11-13 DIAGNOSIS — H61.21 EXCESSIVE CERUMEN IN RIGHT EAR CANAL: ICD-10-CM

## 2020-11-13 DIAGNOSIS — Z00.00 MEDICARE ANNUAL WELLNESS VISIT, SUBSEQUENT: Primary | ICD-10-CM

## 2020-11-13 PROCEDURE — G0439 PPPS, SUBSEQ VISIT: HCPCS | Performed by: INTERNAL MEDICINE

## 2020-11-13 PROCEDURE — 93000 ELECTROCARDIOGRAM COMPLETE: CPT | Performed by: INTERNAL MEDICINE

## 2020-11-13 PROCEDURE — 69210 REMOVE IMPACTED EAR WAX UNI: CPT | Performed by: INTERNAL MEDICINE

## 2020-11-13 RX ORDER — ASCORBIC ACID 500 MG
500 TABLET ORAL DAILY
COMMUNITY

## 2020-11-13 NOTE — PROGRESS NOTES
ANNUAL WELLNESS VISIT    DRUG AND ALCOHOL USE      no tobacco use, alcohol intake:social drinker and caffeine intake: 2 cups of caffeinated coffee per day    DIET AND PHYSICAL ACTIVITY     Diet: general    Exercise: frequently   Exercise Details: yoga and strength training; elliptical 3x/week; also goes to [employer]    MOOD DISORDER AND COGNITIVE SCREENING   Depression Screening Tool Used yes - see PHQ-9; components reviewed with patient; 15-min counseling done - reports several days feeling depressed, hopeless, and down but does have interests and finds pleasure in doing things.  Following social distancing guidelines but is exercising regularly. Reports most nights with sleep issues.  Denies any issues with appetite, concentration, energy, slow thoughts, slow movements, slow speech, negative thoughts.  Screening is negative for depression.   Anxiety Screening Tool Used yes     Mini-Cog Performed   Yes    1. Tell Patient 3 Words apple table danitza    2. Administer Clock Test normal    3. Recall 3 words  apple table danitza    4. Number Correct Items 3    FUNCTIONAL ABILITY AND LEVEL OF SAFETY   Hearing no hearing loss     Wears Hearing Aids No       Current Activities Independent      none  - see Funct/Cog Status Intake     Fall Risk Assessment       Has difficulty with walking or balance  No         Timed Up and Go (TUG) Test  8 sec.       If >12 sec, normal    ADVANCED DIRECTIVE  Advance Care Planning   ACP discussion was held with the patient during this visit. Patient does not have an advance directive, information provided. She does have a health surrogate designated.    Advance Care Planning Discussion:  16 min or more spent on counseling; patient has advanced directive and living will.  Reviewed desires for end of life care, which is to have comfort care.  Patient does not want extraordinary life-sustaining measures, including no  prolonged artificial life support.  Reviewed code status options, meanings,  desires. Patient 's code status is Full Code.   Encouraged patient to ensure family is aware of desires/preferences. Will contact ACP team to help with paperwork; already has healthcare surrogate ( or daughter).    PAIN SCREENING Do you have pain right now? no      If so, 1-10 scale: 0     Intermittent     Do you have pain every day? No      Probable chronic pain: No     Recent Hospitalizations:  No recent hospitalization(s)..     MEDICATION REVIEW   - updated and reviewed (see Medication List).   - reviewed for potentially harmful drug-disease interactions in the elderly.   - reviewed for high risk medications in the elderly.   - aspirin use: No    BMI  Body mass index is 22.84 kg/m².    Patient's Body mass index is 22.84 kg/m². BMI is within normal parameters. No follow-up required..    _________________________________________________________    Chief Complaint   Patient presents with   • Medicare Wellness-subsequent       History of Present Illness  80 y.o.  woman presents for updated wellness visit.  Has been exercising regularly and is remaining well.  Sleep and energy are stable.  Eye exam is pending end of December 2020.    Review of Systems  Denies headaches, visual changes, CP, palpitations, SOB, cough, abd pain, n/v/d, difficulty with urination, numbness/tingling, falls, mood changes, lightheadedness, hearing changes, rashes.    Denies vaginal discharge or bleeding (no periods) or breast concerns.   All other ROS reviewed and negative.    [facility]  The following portions of the patient's history were reviewed and updated as appropriate: allergies, current medications, past family history, past medical history, past social history, past surgical history and problem list.    Current Outpatient Medications:   •  Calcium Carb-Cholecalciferol (CALCIUM 1000 + D PO) QD  •  Cholecalciferol (VITAMIN D3) 5000 units  QD  •  coenzyme Q10 100 MG QD  •  Menaquinone-7 (VITAMIN K2 PO) QD  •  JOINT SUPPORT PO  "QD  •  Life Extension MVI QD  •  Omega-3 1000 MG QD  •  Probiotic Product (PROBIOTIC-10 PO) Qd  •  vitamin C (ASCORBIC ACID) 500 MG QD      VITALS:  /64   Pulse 61   Ht 161.3 cm (63.5\")   Wt 59.4 kg (131 lb)   SpO2 98%   BMI 22.84 kg/m²     Physical Exam  Vitals signs and nursing note reviewed.   Constitutional:       General: She is not in acute distress.     Appearance: Normal appearance. She is well-developed.   HENT:      Head: Normocephalic.      Right Ear: Ear canal and external ear normal. There is impacted cerumen.      Left Ear: Ear canal and external ear normal. There is no impacted cerumen.      Ears:      Comments: Cerumen in the right auditory canal removed with instrumentation with ear scoop, tolerated procedure well, no complications     Nose: Nose normal.   Eyes:      Extraocular Movements: Extraocular movements intact.      Conjunctiva/sclera: Conjunctivae normal.      Pupils: Pupils are equal, round, and reactive to light.      Comments: Wears glasses   Neck:      Musculoskeletal: Normal range of motion and neck supple.      Vascular: No carotid bruit (bilaterally).   Cardiovascular:      Rate and Rhythm: Normal rate and regular rhythm.      Heart sounds: Normal heart sounds.   Pulmonary:      Effort: Pulmonary effort is normal. No respiratory distress.      Breath sounds: Normal breath sounds.   Abdominal:      General: Bowel sounds are normal. There is no distension.      Palpations: Abdomen is soft. There is no mass.      Tenderness: There is no abdominal tenderness.   Genitourinary:     Comments: Chaperone declined by patient.    Breast exam unremarkable without masses, skin changes, nipple discharge, or axillary adenopathy.    Lymphadenopathy:      Cervical: No cervical adenopathy.   Skin:     General: Skin is warm and dry.      Findings: No rash.   Neurological:      Mental Status: She is alert and oriented to person, place, and time.      Cranial Nerves: No cranial nerve deficit. "      Deep Tendon Reflexes: Reflexes normal.   Psychiatric:         Mood and Affect: Mood normal.         Behavior: Behavior normal.           LABS  Results for orders placed or performed in visit on 11/09/20   Comprehensive Metabolic Panel    Specimen: Blood   Result Value Ref Range    Glucose 91 65 - 99 mg/dL    BUN 11 8 - 23 mg/dL    Creatinine 0.54 (L) 0.57 - 1.00 mg/dL    Sodium 140 136 - 145 mmol/L    Potassium 4.8 3.5 - 5.2 mmol/L    Chloride 102 98 - 107 mmol/L    CO2 25.8 22.0 - 29.0 mmol/L    Calcium 9.2 8.6 - 10.5 mg/dL    Total Protein 6.6 6.0 - 8.5 g/dL    Albumin 4.40 3.50 - 5.20 g/dL    ALT (SGPT) 14 1 - 33 U/L    AST (SGOT) 20 1 - 32 U/L    Alkaline Phosphatase 60 39 - 117 U/L    Total Bilirubin 0.4 0.0 - 1.2 mg/dL    eGFR Non African Amer 109 >60 mL/min/1.73    Globulin 2.2 gm/dL    A/G Ratio 2.0 g/dL    BUN/Creatinine Ratio 20.4 7.0 - 25.0    Anion Gap 12.2 5.0 - 15.0 mmol/L   Hepatitis C Antibody    Specimen: Blood   Result Value Ref Range    Hepatitis C Ab Non-Reactive Non-Reactive   Lipid Panel    Specimen: Blood   Result Value Ref Range    Total Cholesterol 182 0 - 200 mg/dL    Triglycerides 42 0 - 150 mg/dL    HDL Cholesterol 72 (H) 40 - 60 mg/dL    LDL Cholesterol  101 (H) 0 - 100 mg/dL    VLDL Cholesterol 9 5 - 40 mg/dL    LDL/HDL Ratio 1.41    TSH    Specimen: Blood   Result Value Ref Range    TSH 1.530 0.270 - 4.200 uIU/mL   CBC Auto Differential    Specimen: Blood   Result Value Ref Range    WBC 4.94 3.40 - 10.80 10*3/mm3    RBC 4.34 3.77 - 5.28 10*6/mm3    Hemoglobin 12.7 12.0 - 15.9 g/dL    Hematocrit 37.1 34.0 - 46.6 %    MCV 85.5 79.0 - 97.0 fL    MCH 29.3 26.6 - 33.0 pg    MCHC 34.2 31.5 - 35.7 g/dL    RDW 12.1 (L) 12.3 - 15.4 %    RDW-SD 37.7 37.0 - 54.0 fl    MPV 10.0 6.0 - 12.0 fL    Platelets 299 140 - 450 10*3/mm3    Neutrophil % 53.9 42.7 - 76.0 %    Lymphocyte % 31.6 19.6 - 45.3 %    Monocyte % 9.9 5.0 - 12.0 %    Eosinophil % 3.6 0.3 - 6.2 %    Basophil % 0.8 0.0 - 1.5 %     Immature Grans % 0.2 0.0 - 0.5 %    Neutrophils, Absolute 2.66 1.70 - 7.00 10*3/mm3    Lymphocytes, Absolute 1.56 0.70 - 3.10 10*3/mm3    Monocytes, Absolute 0.49 0.10 - 0.90 10*3/mm3    Eosinophils, Absolute 0.18 0.00 - 0.40 10*3/mm3    Basophils, Absolute 0.04 0.00 - 0.20 10*3/mm3    Immature Grans, Absolute 0.01 0.00 - 0.05 10*3/mm3    nRBC 0.0 0.0 - 0.2 /100 WBC   Urinalysis without microscopic (no culture) - Urine, Clean Catch    Specimen: Urine, Clean Catch   Result Value Ref Range    Color, UA Yellow Yellow, Straw    Appearance, UA Clear Clear    pH, UA 8.0 5.0 - 8.0    Specific Gravity, UA 1.006 1.005 - 1.030    Glucose, UA Negative Negative    Ketones, UA Negative Negative    Bilirubin, UA Negative Negative    Blood, UA Negative Negative    Protein, UA Negative Negative    Leuk Esterase, UA Negative Negative    Nitrite, UA Negative Negative    Urobilinogen, UA 0.2 E.U./dL 0.2 - 1.0 E.U./dL   Urinalysis, Microscopic Only - Urine, Clean Catch    Specimen: Urine, Clean Catch   Result Value Ref Range    RBC, UA 0-2 None Seen, 0-2 /HPF    WBC, UA 0-2 None Seen, 0-2 /HPF    Bacteria, UA None Seen None Seen /HPF    Squamous Epithelial Cells, UA None Seen None Seen, 0-2 /HPF    Hyaline Casts, UA None Seen None Seen /LPF    Methodology Automated Microscopy        ECG 12 Lead    Date/Time: 11/13/2020 10:48 AM  Performed by: Nicole Arana MD  Authorized by: Nicole Arana MD   Comparison: compared with previous ECG from 11/9/2018  Similar to previous ECG  Rhythm: sinus rhythm  Rate: normal  BPM: 65  Conduction: conduction normal  ST Segments: ST segments normal  T Waves: T waves normal  QRS axis: normal  Other findings: low voltage and poor R wave progression  Clinical impression comment: stable EKG              ASSESSMENT/PLAN    Diagnoses and all orders for this visit:    1. Medicare annual wellness visit, subsequent (Primary)  Assessment & Plan:  Health maintenance - flu vacc refused despite counseling (states   is adamant against flu vacc), Prevnar 2/16, PVX 11/17, Tdap 7/12 (next Td due 2022), rec HAV and shingrix - counseling given, rec getting at pharmacy; mammo 2/21/20 [facility]; no further Paps unless abnlities; DEXA ordered (last 11/17); colonosc 2/14, repeat 2024 per Dr. Peoples; eye exam with Dr. Francis pending 12/20; dental exam q6 mos; (+) seat belt use    Consultants:  Patient Care Team:  Nicole Arana MD as PCP - General (Internal Medicine)  Richelle, Surinder KRISHNAMURTHY MD as Consulting Physician (Colon and Rectal Surgery)  Fransico Juarez MD as Consulting Physician (Obstetrics and Gynecology)  Surinder Fontaine MD as Consulting Physician (Infectious Diseases)  Fransico Kimble MD as Consulting Physician (Dermatology)  Chay Francis III, MD as Consulting Physician (Ophthalmology)  Kimberley Baer MD as Consulting Physician (Dermatology)  Sid Infante MD as Consulting Physician (Dermatology)        Orders:  -     ECG 12 Lead  -     Ambulatory Referral to Advance Care Planning    2. Menopause  -     DEXA Bone Density Axial; Future    3. Excessive cerumen in right ear canal  Comments:  removed with instrumentation with ear scoop, no complications, tolerated procedure well      FOLLOW-UP  RTC 1yr for annual wellness; fasting labs the week prior to appt (CBC, CMP, TSH, lipids, UA/micro)      Electronically signed by:    Nicole Arana MD, FACP  11/13/2020  EMR Dragon/Transcription disclaimer:  Parts of this encounter note is an electronic transcription/translation of spoken language to printed text. Electronic translation of spoken language may permit erroneous, or at times, nonsensical words or phrases, to be inadvertently transcribed. Although I have reviewed the note for such errors, some may still exist.

## 2020-11-23 ENCOUNTER — OFFICE VISIT (OUTPATIENT)
Dept: INTERNAL MEDICINE | Facility: CLINIC | Age: 80
End: 2020-11-23

## 2020-11-23 ENCOUNTER — LAB (OUTPATIENT)
Dept: LAB | Facility: HOSPITAL | Age: 80
End: 2020-11-23

## 2020-11-23 VITALS
HEIGHT: 64 IN | HEART RATE: 69 BPM | DIASTOLIC BLOOD PRESSURE: 64 MMHG | SYSTOLIC BLOOD PRESSURE: 122 MMHG | WEIGHT: 131 LBS | OXYGEN SATURATION: 97 % | BODY MASS INDEX: 22.36 KG/M2

## 2020-11-23 DIAGNOSIS — N30.00 ACUTE CYSTITIS WITHOUT HEMATURIA: Primary | ICD-10-CM

## 2020-11-23 LAB
BILIRUB BLD-MCNC: NEGATIVE MG/DL
CLARITY, POC: CLEAR
COLOR UR: YELLOW
GLUCOSE UR STRIP-MCNC: NEGATIVE MG/DL
KETONES UR QL: NEGATIVE
LEUKOCYTE EST, POC: ABNORMAL
NITRITE UR-MCNC: NEGATIVE MG/ML
PH UR: 6.5 [PH] (ref 5–8)
PROT UR STRIP-MCNC: NEGATIVE MG/DL
RBC # UR STRIP: NEGATIVE /UL
SP GR UR: 1.01 (ref 1–1.03)
UROBILINOGEN UR QL: NORMAL

## 2020-11-23 PROCEDURE — 81003 URINALYSIS AUTO W/O SCOPE: CPT | Performed by: INTERNAL MEDICINE

## 2020-11-23 PROCEDURE — 87086 URINE CULTURE/COLONY COUNT: CPT | Performed by: INTERNAL MEDICINE

## 2020-11-23 PROCEDURE — 99213 OFFICE O/P EST LOW 20 MIN: CPT | Performed by: INTERNAL MEDICINE

## 2020-11-23 RX ORDER — SULFAMETHOXAZOLE AND TRIMETHOPRIM 800; 160 MG/1; MG/1
1 TABLET ORAL 2 TIMES DAILY
Qty: 6 TABLET | Refills: 0 | Status: SHIPPED | OUTPATIENT
Start: 2020-11-23 | End: 2020-11-26

## 2020-11-23 NOTE — PROGRESS NOTES
"Chief Complaint   Patient presents with   • Urinary Frequency       History of Present Illness  80 y.o.  woman presents for urinary symptoms that started Thursday, 4 days ago.  Has been having some burning with urination as well as increased frequency and urgency.  Notes that the urine is murky and she has had some fullness in the lower abdomen.  Has not seen any blood in the urine.  Has had no fevers.  Does not have frequent UTIs.  Is sexually active.    Review of Systems  ROS (+) for dysuria with urinary frequency and urgency but no hematuria.  Denies fever/chills, chest pain, palpitations, shortness of breath.  Has lower abdominal pressure without nausea, vomiting, or diarrhea.  All other ROS reviewed and negative.    [facility]  The following portions of the patient's history were reviewed and updated as appropriate: allergies, current medications, past family history, past medical history, past social history, past surgical history and problem list.    ALLERGIES: nkda    Current Outpatient Medications:   •  Calcium Carb-Cholecalciferol (CALCIUM 1000 + D PO) QD  •  Cholecalciferol (VITAMIN D3) 5000 units QD  •  coenzyme Q10 100 MG QD  •  Menaquinone-7 (VITAMIN K2 PO), Twith CoQ10 QD  •  Misc Natural Products (JOINT SUPPORT PO) QD   •  Multiple Vitamins-Minerals (MULTIVITAMINS PLUS ZINC PO) QD  •  Omega-3 1000 MG QD  •  Probiotic Product (PROBIOTIC-10 PO) QD   •  vitamin C (ASCORBIC ACID) 500 MG QD      VITALS:  /64   Pulse 69   Ht 161.3 cm (63.5\")   Wt 59.4 kg (131 lb)   SpO2 97%   BMI 22.84 kg/m²     Physical Exam  Vitals signs and nursing note reviewed.   Constitutional:       General: She is not in acute distress.     Appearance: Normal appearance.   Eyes:      Extraocular Movements: Extraocular movements intact.      Conjunctiva/sclera: Conjunctivae normal.      Comments: Wears glasses   Cardiovascular:      Rate and Rhythm: Normal rate and regular rhythm.      Heart sounds: Normal heart " sounds.   Pulmonary:      Effort: Pulmonary effort is normal. No respiratory distress.      Breath sounds: Normal breath sounds.   Abdominal:      General: Bowel sounds are normal. There is no distension.      Palpations: Abdomen is soft.      Tenderness: There is no abdominal tenderness (no tend with palpation of suprapubic area). There is no right CVA tenderness or left CVA tenderness.   Neurological:      Mental Status: She is alert and oriented to person, place, and time. Mental status is at baseline.      Gait: Gait normal.   Psychiatric:         Mood and Affect: Mood normal.         Behavior: Behavior normal.         LABS  Results for orders placed or performed in visit on 11/23/20   POC Urinalysis Dipstick, Automated    Specimen: Urine   Result Value Ref Range    Color Yellow Yellow, Straw, Dark Yellow, Adelina    Clarity, UA Clear Clear    Specific Gravity  1.010 1.005 - 1.030    pH, Urine 6.5 5.0 - 8.0    Leukocytes Moderate (2+) (A) Negative    Nitrite, UA Negative Negative    Protein, POC Negative Negative mg/dL    Glucose, UA Negative Negative, 1000 mg/dL (3+) mg/dL    Ketones, UA Negative Negative    Urobilinogen, UA Normal Normal    Bilirubin Negative Negative    Blood, UA Negative Negative           ASSESSMENT/PLAN    Diagnoses and all orders for this visit:    1. Acute cystitis without hematuria (Primary)  Comments:  abnl UA; check Ucx; empirically tx w/ bactrim DS BID x 3d; drink lots of water; f/u prn  Orders:  -     POC Urinalysis Dipstick, Automated  -     sulfamethoxazole-trimethoprim (BACTRIM DS,SEPTRA DS) 800-160 MG per tablet; Take 1 tablet by mouth 2 (Two) Times a Day for 3 days.  Dispense: 6 tablet; Refill: 0  -     Urine Culture - Urine, Urine, Clean Catch; Future  -     Urine Culture - Urine, Urine, Clean Catch        FOLLOW-UP  RTC prn; next wellness scheduled 11/22/21; fasting labs the week prior to appt (CBC, CMP, TSH, lipids, UA/micro)      Electronically signed by:    Nicole Arana MD,  FAC  11/23/2020    EMR Dragon/Transcription disclaimer:  Parts of this encounter note is an electronic transcription/translation of spoken language to printed text. Electronic translation of spoken language may permit erroneous, or at times, nonsensical words or phrases, to be inadvertently transcribed. Although I have reviewed the note for such errors, some may still exist.

## 2020-11-24 ENCOUNTER — TELEPHONE (OUTPATIENT)
Dept: INTERNAL MEDICINE | Facility: CLINIC | Age: 80
End: 2020-11-24

## 2020-11-24 LAB — BACTERIA SPEC AEROBE CULT: ABNORMAL

## 2020-11-24 NOTE — TELEPHONE ENCOUNTER
----- Message from Nicole Arana MD sent at 11/24/2020  8:43 AM EST -----  Urine culture did not grow infection. Ok to stop abx. Recommend OTC azo as needed

## 2020-12-09 ENCOUNTER — HOSPITAL ENCOUNTER (OUTPATIENT)
Dept: BONE DENSITY | Facility: HOSPITAL | Age: 80
Discharge: HOME | End: 2020-12-09
Admitting: INTERNAL MEDICINE

## 2020-12-09 DIAGNOSIS — Z78.0 MENOPAUSE: ICD-10-CM

## 2020-12-09 PROCEDURE — 77080 DXA BONE DENSITY AXIAL: CPT

## 2020-12-09 NOTE — PROGRESS NOTES
Dear Cathy,    Thank you for obtaining your DEXA (bone density) scan.  I have received those results and would like to review them with you.      Your bone density remains in the normal range, but it is worsened compared to your last DEXA in 2017.      Please maintain regular calcium and vitamin D supplementation.  Calcium intake should be 1000mg per day between diet and supplements.  Weight bearing exercise is good for bone health as well.    We should plan to repeat your DEXA in 3 years.  Please let me know if you have any questions or concerns regarding these results.        Sincerely,  Nicole Arana MD, FACP

## 2021-03-26 PROBLEM — C44.729 SQUAMOUS CELL CARCINOMA OF LEFT LOWER LEG: Status: ACTIVE | Noted: 2021-03-26

## 2021-03-30 ENCOUNTER — TELEPHONE (OUTPATIENT)
Dept: INTERNAL MEDICINE | Facility: CLINIC | Age: 81
End: 2021-03-30

## 2021-03-30 NOTE — TELEPHONE ENCOUNTER
DR LANGFORD OFFICE CALLED.  NO FURTHER TREATMENT NEEDED BY DR CARREON, FOLLOW UP WITH [facility] IS ALREADY SCHEDULED

## 2021-03-30 NOTE — TELEPHONE ENCOUNTER
Called and left message with Dr. Baer's assistant to return call on patient to verify that there was no other intervention needed at this time to [phone].

## 2021-03-30 NOTE — TELEPHONE ENCOUNTER
----- Message from Nicole Arana MD sent at 3/26/2021 10:14 PM EDT -----  Regarding: f/u derm  Received derm note from Dr. 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 Please confirm no further intervention needed for this - derm note says just to f/u in 2 months.

## 2021-07-21 ENCOUNTER — OFFICE VISIT (OUTPATIENT)
Dept: INTERNAL MEDICINE | Facility: CLINIC | Age: 81
End: 2021-07-21

## 2021-07-21 VITALS
BODY MASS INDEX: 22.88 KG/M2 | HEIGHT: 64 IN | HEART RATE: 74 BPM | OXYGEN SATURATION: 99 % | WEIGHT: 134 LBS | SYSTOLIC BLOOD PRESSURE: 122 MMHG | DIASTOLIC BLOOD PRESSURE: 70 MMHG

## 2021-07-21 DIAGNOSIS — L23.7 POISON IVY: Primary | ICD-10-CM

## 2021-07-21 DIAGNOSIS — B37.9 CANDIDIASIS: ICD-10-CM

## 2021-07-21 PROCEDURE — 99213 OFFICE O/P EST LOW 20 MIN: CPT | Performed by: HOSPITALIST

## 2021-07-21 RX ORDER — PREDNISONE 5 MG/1
TABLET ORAL
Qty: 14 TABLET | Refills: 0 | Status: SHIPPED | OUTPATIENT
Start: 2021-07-21 | End: 2021-07-28

## 2021-07-21 RX ORDER — NYSTATIN AND TRIAMCINOLONE ACETONIDE 100000; 1 [USP'U]/G; MG/G
OINTMENT TOPICAL 2 TIMES DAILY
Qty: 30 G | Refills: 1 | Status: SHIPPED | OUTPATIENT
Start: 2021-07-21 | End: 2021-11-15

## 2021-07-21 RX ORDER — DIAPER,BRIEF,INFANT-TODD,DISP
EACH MISCELLANEOUS 3 TIMES DAILY
Qty: 45 G | Refills: 1 | Status: SHIPPED | OUTPATIENT
Start: 2021-07-21 | End: 2021-11-15

## 2021-07-21 NOTE — PROGRESS NOTES
[facility] INTERNAL MEDICINE  [address] [address]  Fax [phone]  Phone [phone]    07/21/2021    Cathy Crockett  1940  [id number]    Subjective    Cathy Crockett is a 80 y.o. female who comes in today for evaluation of a rash on her pubic area, bilateral groins and inner thighs. She also reports a separate rash on the right side of her abdomen.  The rash appeared last week after working in her yard and has gotten worse since.  She describes it as what she thought was a few bug bites initially but began to spread after a day or so.  She describes it as very itchy.  She has been applying calamine lotion which provides minimal relief and also baking soda baths which also have provided a little relief.      The following portions of the patient's history were reviewed and updated as appropriate: allergies, current medications, past family history, past medical history, past social history, past surgical history and problem list.    Review of Systems   Constitutional: Negative for activity change and fever.   HENT: Negative for congestion, ear pain, mouth sores, rhinorrhea and sneezing.    Eyes: Negative for visual disturbance.   Respiratory: Negative for cough, chest tightness, shortness of breath and wheezing.    Cardiovascular: Negative for chest pain.   Gastrointestinal: Negative for abdominal distention, abdominal pain, constipation, diarrhea and nausea.   Genitourinary: Negative for difficulty urinating, frequency and urgency.   Musculoskeletal: Negative for arthralgias and joint swelling.   Skin: Positive for rash. Negative for color change.   Allergic/Immunologic: Negative for environmental allergies.   Neurological: Negative for dizziness, seizures, syncope, weakness, light-headedness and headaches.   Hematological: Negative for adenopathy.   Psychiatric/Behavioral: Negative for agitation, confusion and sleep disturbance. The patient is  "not nervous/anxious.        Objective    /70   Pulse 74   Ht 161.3 cm (63.5\")   Wt 60.8 kg (134 lb)   SpO2 99%   Breastfeeding No   BMI 23.36 kg/m²    Physical Exam  Constitutional:       General: She is not in acute distress.     Appearance: Normal appearance. She is not ill-appearing.   HENT:      Head: Normocephalic.      Nose: Nose normal. No congestion or rhinorrhea.      Mouth/Throat:      Mouth: Mucous membranes are moist.      Pharynx: Oropharynx is clear. No oropharyngeal exudate or posterior oropharyngeal erythema.   Eyes:      Extraocular Movements: Extraocular movements intact.      Pupils: Pupils are equal, round, and reactive to light.   Cardiovascular:      Rate and Rhythm: Normal rate and regular rhythm.      Pulses: Normal pulses.      Heart sounds: Normal heart sounds. No murmur heard.     Pulmonary:      Effort: Pulmonary effort is normal. No respiratory distress.      Breath sounds: Normal breath sounds.   Abdominal:      General: Abdomen is flat. Bowel sounds are normal. There is no distension.      Palpations: Abdomen is soft.      Tenderness: There is no abdominal tenderness.   Musculoskeletal:         General: No swelling or tenderness. Normal range of motion.      Cervical back: Normal range of motion and neck supple.   Lymphadenopathy:      Cervical: No cervical adenopathy.   Skin:     General: Skin is warm and dry.      Capillary Refill: Capillary refill takes less than 2 seconds.      Findings: Erythema and rash present. Rash is macular, papular and vesicular.             Comments: Mild to moderate swelling of bilateral inner thighs.    Neurological:      General: No focal deficit present.      Mental Status: She is alert and oriented to person, place, and time.      Motor: No weakness.      Gait: Gait normal.   Psychiatric:         Mood and Affect: Mood normal.         Behavior: Behavior normal.         Thought Content: Thought content normal. "         Procedures    Assessment/Plan  Diagnoses and all orders for this visit:    1. Poison ivy (Primary)  -     predniSONE 5 MG (48) tablet therapy pack dose pack; Take 20 mg daily x 2 days, then 10 mg daily x 2 days, then 5mg daily x 2 days.  Dispense: 14 tablet; Refill: 0  -     hydrocortisone 1 % cream; Apply  topically to the appropriate area as directed 3 (Three) Times a Day.  Dispense: 45 g; Refill: 1    2. Candidiasis  -     nystatin-triamcinolone (MYCOLOG) 544793-1.1 UNIT/GM-% ointment; Apply  topically to the appropriate area as directed 2 (Two) Times a Day.  Dispense: 30 g; Refill: 1    Prescription for prednisone taper. Hydrocortisone cream for itching and encouraged patient to continue baking soda baths daily for relief of itching as well.  Nystatin cream to bilateral groins for yeast.       Return if symptoms worsen or fail to improve.    I spent 15 minutes caring for Cathy on this date of service. This time includes time spent by me in the following activities: preparing for the visit, reviewing tests, obtaining and/or reviewing a separately obtained history, performing a medically appropriate examination and/or evaluation , counseling and educating the patient/family/caregiver, documenting information in the medical record, independently interpreting results and communicating that information with the patient/family/caregiver and care coordination

## 2021-11-08 ENCOUNTER — TELEPHONE (OUTPATIENT)
Dept: INTERNAL MEDICINE | Facility: CLINIC | Age: 81
End: 2021-11-08

## 2021-11-08 DIAGNOSIS — Z00.00 MEDICARE ANNUAL WELLNESS VISIT, SUBSEQUENT: Primary | ICD-10-CM

## 2021-11-08 NOTE — TELEPHONE ENCOUNTER
Caller: Cathy Crockett    Relationship: Self    Best call back number: [phone]    What orders are you requesting (i.e. lab or imaging): LABS FOR MEDICARE WELLNESS    In what timeframe would the patient need to come in: Wednesday 11/11    Where will you receive your lab/imaging services: LAB    Additional notes: PATIENT WOULD LIKE A CALL WHEN ORDERS ARE PLACED

## 2021-11-10 ENCOUNTER — LAB (OUTPATIENT)
Dept: LAB | Facility: HOSPITAL | Age: 81
End: 2021-11-10

## 2021-11-10 DIAGNOSIS — Z00.00 MEDICARE ANNUAL WELLNESS VISIT, SUBSEQUENT: ICD-10-CM

## 2021-11-10 LAB
ALBUMIN SERPL-MCNC: 4.5 G/DL (ref 3.5–5.2)
ALBUMIN/GLOB SERPL: 1.9 G/DL
ALP SERPL-CCNC: 72 U/L (ref 39–117)
ALT SERPL W P-5'-P-CCNC: 15 U/L (ref 1–33)
ANION GAP SERPL CALCULATED.3IONS-SCNC: 6 MMOL/L (ref 5–15)
AST SERPL-CCNC: 23 U/L (ref 1–32)
BASOPHILS # BLD AUTO: 0.05 10*3/MM3 (ref 0–0.2)
BASOPHILS NFR BLD AUTO: 1 % (ref 0–1.5)
BILIRUB SERPL-MCNC: 0.4 MG/DL (ref 0–1.2)
BILIRUB UR QL STRIP: NEGATIVE
BUN SERPL-MCNC: 9 MG/DL (ref 8–23)
BUN/CREAT SERPL: 16.4 (ref 7–25)
CALCIUM SPEC-SCNC: 9.4 MG/DL (ref 8.6–10.5)
CHLORIDE SERPL-SCNC: 100 MMOL/L (ref 98–107)
CHOLEST SERPL-MCNC: 192 MG/DL (ref 0–200)
CLARITY UR: CLEAR
CO2 SERPL-SCNC: 30 MMOL/L (ref 22–29)
COLOR UR: YELLOW
CREAT SERPL-MCNC: 0.55 MG/DL (ref 0.57–1)
DEPRECATED RDW RBC AUTO: 37.1 FL (ref 37–54)
EOSINOPHIL # BLD AUTO: 0.17 10*3/MM3 (ref 0–0.4)
EOSINOPHIL NFR BLD AUTO: 3.5 % (ref 0.3–6.2)
ERYTHROCYTE [DISTWIDTH] IN BLOOD BY AUTOMATED COUNT: 11.9 % (ref 12.3–15.4)
GFR SERPL CREATININE-BSD FRML MDRD: 106 ML/MIN/1.73
GLOBULIN UR ELPH-MCNC: 2.4 GM/DL
GLUCOSE SERPL-MCNC: 90 MG/DL (ref 65–99)
GLUCOSE UR STRIP-MCNC: NEGATIVE MG/DL
HCT VFR BLD AUTO: 38.9 % (ref 34–46.6)
HDLC SERPL-MCNC: 69 MG/DL (ref 40–60)
HGB BLD-MCNC: 13.4 G/DL (ref 12–15.9)
HGB UR QL STRIP.AUTO: NEGATIVE
IMM GRANULOCYTES # BLD AUTO: 0.02 10*3/MM3 (ref 0–0.05)
IMM GRANULOCYTES NFR BLD AUTO: 0.4 % (ref 0–0.5)
KETONES UR QL STRIP: NEGATIVE
LDLC SERPL CALC-MCNC: 114 MG/DL (ref 0–100)
LDLC/HDLC SERPL: 1.65 {RATIO}
LEUKOCYTE ESTERASE UR QL STRIP.AUTO: ABNORMAL
LYMPHOCYTES # BLD AUTO: 1.51 10*3/MM3 (ref 0.7–3.1)
LYMPHOCYTES NFR BLD AUTO: 31.3 % (ref 19.6–45.3)
MCH RBC QN AUTO: 29.3 PG (ref 26.6–33)
MCHC RBC AUTO-ENTMCNC: 34.4 G/DL (ref 31.5–35.7)
MCV RBC AUTO: 85.1 FL (ref 79–97)
MONOCYTES # BLD AUTO: 0.48 10*3/MM3 (ref 0.1–0.9)
MONOCYTES NFR BLD AUTO: 10 % (ref 5–12)
NEUTROPHILS NFR BLD AUTO: 2.59 10*3/MM3 (ref 1.7–7)
NEUTROPHILS NFR BLD AUTO: 53.8 % (ref 42.7–76)
NITRITE UR QL STRIP: NEGATIVE
NRBC BLD AUTO-RTO: 0 /100 WBC (ref 0–0.2)
PH UR STRIP.AUTO: 8.5 [PH] (ref 5–8)
PLATELET # BLD AUTO: 290 10*3/MM3 (ref 140–450)
PMV BLD AUTO: 10.3 FL (ref 6–12)
POTASSIUM SERPL-SCNC: 4.7 MMOL/L (ref 3.5–5.2)
PROT SERPL-MCNC: 6.9 G/DL (ref 6–8.5)
PROT UR QL STRIP: NEGATIVE
RBC # BLD AUTO: 4.57 10*6/MM3 (ref 3.77–5.28)
SODIUM SERPL-SCNC: 136 MMOL/L (ref 136–145)
SP GR UR STRIP: 1.01 (ref 1–1.03)
TRIGL SERPL-MCNC: 45 MG/DL (ref 0–150)
TSH SERPL DL<=0.05 MIU/L-ACNC: 1.37 UIU/ML (ref 0.27–4.2)
UROBILINOGEN UR QL STRIP: ABNORMAL
VLDLC SERPL-MCNC: 9 MG/DL (ref 5–40)
WBC # BLD AUTO: 4.82 10*3/MM3 (ref 3.4–10.8)

## 2021-11-10 PROCEDURE — 81001 URINALYSIS AUTO W/SCOPE: CPT

## 2021-11-10 PROCEDURE — 84443 ASSAY THYROID STIM HORMONE: CPT

## 2021-11-10 PROCEDURE — 80061 LIPID PANEL: CPT

## 2021-11-10 PROCEDURE — 80053 COMPREHEN METABOLIC PANEL: CPT

## 2021-11-10 PROCEDURE — 85025 COMPLETE CBC W/AUTO DIFF WBC: CPT

## 2021-11-11 LAB
BACTERIA UR QL AUTO: NORMAL /HPF
HYALINE CASTS UR QL AUTO: NORMAL /LPF
RBC # UR: NORMAL /HPF
REF LAB TEST METHOD: NORMAL
SQUAMOUS #/AREA URNS HPF: NORMAL /HPF
WBC UR QL AUTO: NORMAL /HPF

## 2021-11-14 PROBLEM — C44.729 SQUAMOUS CELL CARCINOMA OF LEFT LOWER LEG: Chronic | Status: ACTIVE | Noted: 2021-03-26

## 2021-11-14 PROBLEM — IMO0002 SQUAMOUS CELL CARCINOMA: Chronic | Status: ACTIVE | Noted: 2018-10-20

## 2021-11-14 PROBLEM — N95.2 POSTMENOPAUSAL ATROPHIC VAGINITIS: Chronic | Status: ACTIVE | Noted: 2017-11-07

## 2021-11-14 PROBLEM — C44.91 BASAL CELL CARCINOMA OF SKIN: Chronic | Status: ACTIVE | Noted: 2017-09-29

## 2021-11-14 PROBLEM — R76.11 POSITIVE REACTION TO TUBERCULIN SKIN TEST: Chronic | Status: ACTIVE | Noted: 2017-09-29

## 2021-11-14 PROBLEM — K57.30 DIVERTICULOSIS OF LARGE INTESTINE: Chronic | Status: ACTIVE | Noted: 2017-09-29

## 2021-11-14 PROBLEM — H25.012 CORTICAL AGE-RELATED CATARACT OF LEFT EYE: Chronic | Status: ACTIVE | Noted: 2017-11-07

## 2021-11-14 PROBLEM — D36.14: Chronic | Status: ACTIVE | Noted: 2017-10-06

## 2021-11-14 PROBLEM — Z00.00 MEDICARE ANNUAL WELLNESS VISIT, SUBSEQUENT: Chronic | Status: ACTIVE | Noted: 2017-11-07

## 2021-11-14 PROBLEM — Z78.0 MENOPAUSE: Chronic | Status: ACTIVE | Noted: 2017-09-29

## 2021-11-14 PROBLEM — G47.00 INSOMNIA: Chronic | Status: ACTIVE | Noted: 2017-09-29

## 2021-11-14 NOTE — ASSESSMENT & PLAN NOTE
Health maintenance - COVID19 vacc strongly recommended, extensive counseling given re: risks of vacc versus risks of COVID19 infection, also Pfizer is official FDA-approved; flu vacc refused despite counseling, Prevnar 2/16, PVX 11/17, Tdap 7/12 (next Td due 2022), rec HAV and Shingrix - counseling given (she is thinking about the Shingrix); mammo 2/26/21 [facility] - counseling whether to continue q1yr, q2yr or to stop screening unless abnlities (patient will think about it); no further Paps unless abnlities; DEXA 12/20, repeat 2023; colonosc 2/14, repeat 2024 per Dr. Peoples; eye exam with Dr. Francis pending 12/21 (annually per pt); dental exam q6 mos; (+) seat belt use    Consultants:  Patient Care Team:  Nicole Arana MD as PCP - General (Internal Medicine)  Richelle, Surinder KRISHNAMURTHY MD as Consulting Physician (Colon and Rectal Surgery)  Fransico Juarez MD as Consulting Physician (Obstetrics and Gynecology)  Surinder Fontaine MD as Consulting Physician (Infectious Diseases)  Fransico Kimble MD as Consulting Physician (Dermatology)  Chay Francis III, MD as Consulting Physician (Ophthalmology)  Kimberley Baer MD as Consulting Physician (Dermatology)  Sid Infante MD as Consulting Physician (Dermatology)

## 2021-11-14 NOTE — PROGRESS NOTES
ANNUAL WELLNESS VISIT    DRUG AND ALCOHOL USE      no tobacco use, alcohol intake:2-3 drinks per week and caffeine intake: 3 cups of caffeinated coffee per day    DIET AND PHYSICAL ACTIVITY     Diet: general    Exercise: frequently   Exercise Details: weight training and elliptical     MOOD DISORDER AND COGNITIVE SCREENING   Depression Screening Tool Used yes - see PHQ-9; components reviewed with patient; Denies feeling blue, hopeless, depressed or not having pleasure doing things. Reports no concerns with sleep and no concerns with energy. Reports no concerns with appetite. Denies slow thoughts, slow movements, issues with concentration, or negative thoughts. Screening is NEG for active depression.      Anxiety Screening Tool Used yes     Mini-Cog Performed   Yes    1. Tell Patient 3 Words car tie danitza    2. Administer Clock Test normal    3. Recall 3 words  car tie danitza    4. Number Correct Items 3    FUNCTIONAL ABILITY AND LEVEL OF SAFETY   Hearing no hearing loss     Wears Hearing Aids No       Current Activities Independent      none  - see Funct/Cog Status Intake     Fall Risk Assessment       Has difficulty with walking or balance  No         Timed Up and Go (TUG) Test  8 sec.       If >12 sec, normal    ADVANCED DIRECTIVE  Advance Care Planning   ACP discussion was held with the patient during this visit. Patient does not have an advance directive, information provided.     Advance Care Planning Discussion:  16 min or more spent on counseling; patient does not have advanced directive and living will but states she DOES have healthcare surrogacy paperwork.  Reviewed desires for end of life care, which is to have comfort care.  Patient does not want extraordinary life-sustaining measures, including no chest compression, shocks, intubation/ventilator use, feeding tube, or prolonged artificial life support.  Reviewed code status options, meanings, desires. Patient 's code status is DNR.   Encouraged patient  to ensure family is aware of desires/preferences. ACP pamphlet provided to patient and sections that need to be completed were reviewed with patient in detail. She states her  is her healthcare surrogate; if unable, her 2 daughters would be the alternates. This has been updated in Epic.    PAIN SCREENING Do you have pain right now? no      If so, 1-10 scale: 0     Intermittent     Do you have pain every day? Yes      Probable chronic pain: Yes     Recent Hospitalizations:  No recent hospitalization(s)..     MEDICATION REVIEW   - updated and reviewed (see Medication List).   - reviewed for potentially harmful drug-disease interactions in the elderly.   - reviewed for high risk medications in the elderly.   - aspirin use: No, Not Indicated    BMI  Body mass index is 22.84 kg/m².    Patient's Body mass index is 22.84 kg/m². indicating that she is within normal range (BMI 18.5-24.9). No BMI management plan needed..    _________________________________________________________    Chief Complaint   Patient presents with   • Medicare Wellness-subsequent       History of Present Illness  81 y.o.  woman presents for updated wellness visit. Has not gotten COVID-19 vaccine (nor  (because of concerns regarding potential side effects from the vaccine. Also continues to refuse flu vaccine. Feels well overall without any complaints today except for progressively worsening left knee pain. States that she has some crepitus. States that she is doing some strength training for the legs. Has not fallen or had injuries.    Review of Systems  Denies headaches, visual changes, CP, palpitations, SOB, cough, abd pain, n/v/d, difficulty with urination, numbness/tingling, falls, mood changes, lightheadedness, hearing changes, rashes.    ROS (+) for left knee pain and crepitus.    Denies vaginal discharge or bleeding (no periods) or breast concerns.   All other ROS reviewed and negative.      Current Outpatient  "Medications:   •  Calcium Carb-Cholecalciferol QD   •  Cholecalciferol (VITAMIN D3) 5000 units QD  •  coenzyme Q10 100 MG QD  •  hydrocortisone 1 % cream, AD  •  Menaquinone-7 (VITAMIN K2 PO) QD  •  JOINT SUPPORT POQD  •  MULTIVITAMINS PLUS ZINC PO QD  •  nystatin-triamcinolone (MYCOLOG) 794202-8.1 UNIT/GM-% ointment, AD  •  Omega-3 1000 MG QD  •  Probiotic Product QD  •  vitamin C (ASCORBIC ACID) 500 MG QD      VITALS:  /70   Pulse 64   Ht 161.3 cm (63.5\")   Wt 59.4 kg (131 lb)   SpO2 98%   BMI 22.84 kg/m²     Physical Exam  Vitals and nursing note reviewed.   Constitutional:       General: She is not in acute distress.     Appearance: Normal appearance. She is well-developed.   HENT:      Head: Normocephalic.      Right Ear: Ear canal and external ear normal. There is impacted cerumen (Right auditory canal occluded by cerumen (same as last year per patient)).      Left Ear: Tympanic membrane, ear canal and external ear normal.      Nose: Nose normal.   Eyes:      Extraocular Movements: Extraocular movements intact.      Conjunctiva/sclera: Conjunctivae normal.      Pupils: Pupils are equal, round, and reactive to light.      Comments: Wears glasses   Neck:      Vascular: No carotid bruit (bilaterally).   Cardiovascular:      Rate and Rhythm: Normal rate and regular rhythm.      Pulses: Normal pulses.      Heart sounds: Normal heart sounds.      Comments: 2+ PT pulses bilaterally  Pulmonary:      Effort: Pulmonary effort is normal. No respiratory distress.      Breath sounds: Normal breath sounds. No wheezing or rales.   Abdominal:      General: Bowel sounds are normal. There is no distension.      Palpations: Abdomen is soft. There is no mass.      Tenderness: There is no abdominal tenderness.   Genitourinary:     Comments: Chaperone declined by patient.    Breast exam unremarkable without masses, skin changes, nipple discharge, or axillary adenopathy.      Musculoskeletal:      Cervical back: Normal " range of motion and neck supple.      Right lower leg: No edema.      Left lower leg: No edema.      Comments: Left knee mild crepitus, FROM, no tenderness or swelling   Lymphadenopathy:      Cervical: No cervical adenopathy.   Skin:     General: Skin is warm and dry.      Findings: No rash.   Neurological:      Mental Status: She is alert and oriented to person, place, and time.      Cranial Nerves: No cranial nerve deficit.      Gait: Gait normal.      Deep Tendon Reflexes: Reflexes normal.   Psychiatric:         Mood and Affect: Mood normal.         Behavior: Behavior normal.         LABS  Results for orders placed or performed in visit on 11/10/21   Comprehensive metabolic panel    Specimen: Blood   Result Value Ref Range    Glucose 90 65 - 99 mg/dL    BUN 9 8 - 23 mg/dL    Creatinine 0.55 (L) 0.57 - 1.00 mg/dL    Sodium 136 136 - 145 mmol/L    Potassium 4.7 3.5 - 5.2 mmol/L    Chloride 100 98 - 107 mmol/L    CO2 30.0 (H) 22.0 - 29.0 mmol/L    Calcium 9.4 8.6 - 10.5 mg/dL    Total Protein 6.9 6.0 - 8.5 g/dL    Albumin 4.50 3.50 - 5.20 g/dL    ALT (SGPT) 15 1 - 33 U/L    AST (SGOT) 23 1 - 32 U/L    Alkaline Phosphatase 72 39 - 117 U/L    Total Bilirubin 0.4 0.0 - 1.2 mg/dL    eGFR Non African Amer 106 >60 mL/min/1.73    Globulin 2.4 gm/dL    A/G Ratio 1.9 g/dL    BUN/Creatinine Ratio 16.4 7.0 - 25.0    Anion Gap 6.0 5.0 - 15.0 mmol/L   TSH    Specimen: Blood   Result Value Ref Range    TSH 1.370 0.270 - 4.200 uIU/mL   Lipid panel    Specimen: Blood   Result Value Ref Range    Total Cholesterol 192 0 - 200 mg/dL    Triglycerides 45 0 - 150 mg/dL    HDL Cholesterol 69 (H) 40 - 60 mg/dL    LDL Cholesterol  114 (H) 0 - 100 mg/dL    VLDL Cholesterol 9 5 - 40 mg/dL    LDL/HDL Ratio 1.65    CBC Auto Differential    Specimen: Blood   Result Value Ref Range    WBC 4.82 3.40 - 10.80 10*3/mm3    RBC 4.57 3.77 - 5.28 10*6/mm3    Hemoglobin 13.4 12.0 - 15.9 g/dL    Hematocrit 38.9 34.0 - 46.6 %    MCV 85.1 79.0 - 97.0 fL     MCH 29.3 26.6 - 33.0 pg    MCHC 34.4 31.5 - 35.7 g/dL    RDW 11.9 (L) 12.3 - 15.4 %    RDW-SD 37.1 37.0 - 54.0 fl    MPV 10.3 6.0 - 12.0 fL    Platelets 290 140 - 450 10*3/mm3    Neutrophil % 53.8 42.7 - 76.0 %    Lymphocyte % 31.3 19.6 - 45.3 %    Monocyte % 10.0 5.0 - 12.0 %    Eosinophil % 3.5 0.3 - 6.2 %    Basophil % 1.0 0.0 - 1.5 %    Immature Grans % 0.4 0.0 - 0.5 %    Neutrophils, Absolute 2.59 1.70 - 7.00 10*3/mm3    Lymphocytes, Absolute 1.51 0.70 - 3.10 10*3/mm3    Monocytes, Absolute 0.48 0.10 - 0.90 10*3/mm3    Eosinophils, Absolute 0.17 0.00 - 0.40 10*3/mm3    Basophils, Absolute 0.05 0.00 - 0.20 10*3/mm3    Immature Grans, Absolute 0.02 0.00 - 0.05 10*3/mm3    nRBC 0.0 0.0 - 0.2 /100 WBC   Urinalysis without microscopic (no culture) - Urine, Clean Catch    Specimen: Urine, Clean Catch   Result Value Ref Range    Color, UA Yellow Yellow, Straw    Appearance, UA Clear Clear    pH, UA 8.5 (H) 5.0 - 8.0    Specific Gravity, UA 1.006 1.005 - 1.030    Glucose, UA Negative Negative    Ketones, UA Negative Negative    Bilirubin, UA Negative Negative    Blood, UA Negative Negative    Protein, UA Negative Negative    Leuk Esterase, UA Small (1+) (A) Negative    Nitrite, UA Negative Negative    Urobilinogen, UA 0.2 E.U./dL 0.2 - 1.0 E.U./dL   Urinalysis, Microscopic Only - Urine, Clean Catch    Specimen: Urine, Clean Catch   Result Value Ref Range    RBC, UA 0-2 None Seen, 0-2 /HPF    WBC, UA 0-2 None Seen, 0-2 /HPF    Bacteria, UA None Seen None Seen /HPF    Squamous Epithelial Cells, UA 0-2 None Seen, 0-2 /HPF    Hyaline Casts, UA None Seen None Seen /LPF    Methodology Automated Microscopy      11/20     ASSESSMENT/PLAN    Diagnoses and all orders for this visit:    1. Medicare annual wellness visit, subsequent (Primary)  Assessment & Plan:  Health maintenance - COVID19 vacc strongly recommended, extensive counseling given re: risks of vacc versus risks of COVID19 infection, also Pfizer is official  FDA-approved; flu vacc refused despite counseling, Prevnar 2/16, PVX 11/17, Tdap 7/12 (next Td due 2022), rec HAV and Shingrix - counseling given (she is thinking about the Shingrix); mammo 2/26/21 [facility] - counseling whether to continue q1yr, q2yr or to stop screening unless abnlities (patient will think about it); no further Paps unless abnlities; DEXA 12/20, repeat 2023; colonosc 2/14, repeat 2024 per Dr. Peoples; eye exam with Dr. Francis pending 12/21 (annually per pt); dental exam q6 mos; (+) seat belt use    Consultants:  Patient Care Team:  Nicole Arana MD as PCP - General (Internal Medicine)  Richelle, Surinder KRISHNAMURTHY MD as Consulting Physician (Colon and Rectal Surgery)  Fransico Juarez MD as Consulting Physician (Obstetrics and Gynecology)  Surinder Fontaine MD as Consulting Physician (Infectious Diseases)  Fransico Kimble MD as Consulting Physician (Dermatology)  Chay Francis III, MD as Consulting Physician (Ophthalmology)  Kimberley Baer MD as Consulting Physician (Dermatology)  Sid Infante MD as Consulting Physician (Dermatology)          2. Vaccine counseling  Comments:  prolonged > 10 min conversation about COVID19 vacc risks/benefits and recommendations/current guidelines; all questions answered; other vacc reviewed as well    3. Osteoarthritis of left knee  Assessment & Plan:  Fall precautions; advised continuing strength exercises for quad and hamstring; she states turmeric also helps      4. Right ear impacted cerumen  Comments:  s/p lavage with water pick and instrumentation with ear scoop; tolerated procedure well; no complications      FOLLOW-UP  RTC 1yr for annual wellness visit; fasting labs the week prior to appt (CBC, CMP, TSH, lipids, UA/micro)      Electronically signed by:    Nicole Arana MD, FACP  11/15/2021

## 2021-11-15 ENCOUNTER — OFFICE VISIT (OUTPATIENT)
Dept: INTERNAL MEDICINE | Facility: CLINIC | Age: 81
End: 2021-11-15

## 2021-11-15 VITALS
HEIGHT: 64 IN | BODY MASS INDEX: 22.36 KG/M2 | HEART RATE: 64 BPM | SYSTOLIC BLOOD PRESSURE: 124 MMHG | OXYGEN SATURATION: 98 % | WEIGHT: 131 LBS | DIASTOLIC BLOOD PRESSURE: 70 MMHG

## 2021-11-15 DIAGNOSIS — Z00.00 MEDICARE ANNUAL WELLNESS VISIT, SUBSEQUENT: Primary | Chronic | ICD-10-CM

## 2021-11-15 DIAGNOSIS — Z71.85 VACCINE COUNSELING: ICD-10-CM

## 2021-11-15 DIAGNOSIS — H61.21 RIGHT EAR IMPACTED CERUMEN: ICD-10-CM

## 2021-11-15 DIAGNOSIS — M17.12 PRIMARY OSTEOARTHRITIS OF LEFT KNEE: Chronic | ICD-10-CM

## 2021-11-15 PROCEDURE — 69210 REMOVE IMPACTED EAR WAX UNI: CPT | Performed by: INTERNAL MEDICINE

## 2021-11-15 PROCEDURE — G0439 PPPS, SUBSEQ VISIT: HCPCS | Performed by: INTERNAL MEDICINE

## 2021-11-15 PROCEDURE — 99497 ADVNCD CARE PLAN 30 MIN: CPT | Performed by: INTERNAL MEDICINE

## 2021-11-15 PROCEDURE — 1170F FXNL STATUS ASSESSED: CPT | Performed by: INTERNAL MEDICINE

## 2021-11-15 PROCEDURE — 1126F AMNT PAIN NOTED NONE PRSNT: CPT | Performed by: INTERNAL MEDICINE

## 2021-11-15 PROCEDURE — 99213 OFFICE O/P EST LOW 20 MIN: CPT | Performed by: INTERNAL MEDICINE

## 2021-11-15 PROCEDURE — 1160F RVW MEDS BY RX/DR IN RCRD: CPT | Performed by: INTERNAL MEDICINE

## 2021-11-15 NOTE — ASSESSMENT & PLAN NOTE
Fall precautions; advised continuing strength exercises for quad and hamstring; she states turmeric also helps

## 2022-08-05 PROBLEM — L82.1 SEBORRHEIC KERATOSIS: Status: ACTIVE | Noted: 2022-08-05

## 2022-11-04 DIAGNOSIS — Z00.00 MEDICARE ANNUAL WELLNESS VISIT, SUBSEQUENT: Primary | Chronic | ICD-10-CM

## 2022-11-10 ENCOUNTER — TELEPHONE (OUTPATIENT)
Dept: INTERNAL MEDICINE | Facility: CLINIC | Age: 82
End: 2022-11-10

## 2022-11-10 DIAGNOSIS — M17.12 PRIMARY OSTEOARTHRITIS OF LEFT KNEE: Chronic | ICD-10-CM

## 2022-11-10 DIAGNOSIS — G47.00 INSOMNIA, UNSPECIFIED TYPE: Chronic | ICD-10-CM

## 2022-11-10 DIAGNOSIS — Z00.00 MEDICARE ANNUAL WELLNESS VISIT, SUBSEQUENT: Primary | Chronic | ICD-10-CM

## 2022-11-10 DIAGNOSIS — N95.2 POSTMENOPAUSAL ATROPHIC VAGINITIS: Chronic | ICD-10-CM

## 2022-11-10 DIAGNOSIS — H25.012 CORTICAL AGE-RELATED CATARACT OF LEFT EYE: Chronic | ICD-10-CM

## 2022-11-10 DIAGNOSIS — L82.1 SEBORRHEIC KERATOSIS: ICD-10-CM

## 2022-11-10 DIAGNOSIS — C44.91 BASAL CELL CARCINOMA (BCC), UNSPECIFIED SITE: Chronic | ICD-10-CM

## 2022-11-14 ENCOUNTER — LAB (OUTPATIENT)
Dept: LAB | Facility: HOSPITAL | Age: 82
End: 2022-11-14

## 2022-11-14 DIAGNOSIS — Z00.00 MEDICARE ANNUAL WELLNESS VISIT, SUBSEQUENT: ICD-10-CM

## 2022-11-14 LAB
ALBUMIN SERPL-MCNC: 4.4 G/DL (ref 3.5–5.2)
ALBUMIN/GLOB SERPL: 1.6 G/DL
ALP SERPL-CCNC: 67 U/L (ref 39–117)
ALT SERPL W P-5'-P-CCNC: 16 U/L (ref 1–33)
ANION GAP SERPL CALCULATED.3IONS-SCNC: 10 MMOL/L (ref 5–15)
AST SERPL-CCNC: 25 U/L (ref 1–32)
BACTERIA UR QL AUTO: NORMAL /HPF
BASOPHILS # BLD AUTO: 0.05 10*3/MM3 (ref 0–0.2)
BASOPHILS NFR BLD AUTO: 0.9 % (ref 0–1.5)
BILIRUB SERPL-MCNC: 0.5 MG/DL (ref 0–1.2)
BILIRUB UR QL STRIP: NEGATIVE
BUN SERPL-MCNC: 12 MG/DL (ref 8–23)
BUN/CREAT SERPL: 19.4 (ref 7–25)
CALCIUM SPEC-SCNC: 10.2 MG/DL (ref 8.6–10.5)
CHLORIDE SERPL-SCNC: 101 MMOL/L (ref 98–107)
CHOLEST SERPL-MCNC: 206 MG/DL (ref 0–200)
CLARITY UR: CLEAR
CO2 SERPL-SCNC: 31 MMOL/L (ref 22–29)
COLOR UR: YELLOW
CREAT SERPL-MCNC: 0.62 MG/DL (ref 0.57–1)
DEPRECATED RDW RBC AUTO: 35.5 FL (ref 37–54)
EGFRCR SERPLBLD CKD-EPI 2021: 89.6 ML/MIN/1.73
EOSINOPHIL # BLD AUTO: 0.17 10*3/MM3 (ref 0–0.4)
EOSINOPHIL NFR BLD AUTO: 3.2 % (ref 0.3–6.2)
ERYTHROCYTE [DISTWIDTH] IN BLOOD BY AUTOMATED COUNT: 11.7 % (ref 12.3–15.4)
GLOBULIN UR ELPH-MCNC: 2.7 GM/DL
GLUCOSE SERPL-MCNC: 88 MG/DL (ref 65–99)
GLUCOSE UR STRIP-MCNC: NEGATIVE MG/DL
HCT VFR BLD AUTO: 38.2 % (ref 34–46.6)
HDLC SERPL-MCNC: 83 MG/DL (ref 40–60)
HGB BLD-MCNC: 13.2 G/DL (ref 12–15.9)
HGB UR QL STRIP.AUTO: NEGATIVE
HYALINE CASTS UR QL AUTO: NORMAL /LPF
IMM GRANULOCYTES # BLD AUTO: 0.02 10*3/MM3 (ref 0–0.05)
IMM GRANULOCYTES NFR BLD AUTO: 0.4 % (ref 0–0.5)
KETONES UR QL STRIP: NEGATIVE
LDLC SERPL CALC-MCNC: 116 MG/DL (ref 0–100)
LDLC/HDLC SERPL: 1.39 {RATIO}
LEUKOCYTE ESTERASE UR QL STRIP.AUTO: NEGATIVE
LYMPHOCYTES # BLD AUTO: 1.87 10*3/MM3 (ref 0.7–3.1)
LYMPHOCYTES NFR BLD AUTO: 35.3 % (ref 19.6–45.3)
MCH RBC QN AUTO: 28.6 PG (ref 26.6–33)
MCHC RBC AUTO-ENTMCNC: 34.6 G/DL (ref 31.5–35.7)
MCV RBC AUTO: 82.7 FL (ref 79–97)
MONOCYTES # BLD AUTO: 0.51 10*3/MM3 (ref 0.1–0.9)
MONOCYTES NFR BLD AUTO: 9.6 % (ref 5–12)
NEUTROPHILS NFR BLD AUTO: 2.67 10*3/MM3 (ref 1.7–7)
NEUTROPHILS NFR BLD AUTO: 50.6 % (ref 42.7–76)
NITRITE UR QL STRIP: NEGATIVE
NRBC BLD AUTO-RTO: 0 /100 WBC (ref 0–0.2)
PH UR STRIP.AUTO: 7.5 [PH] (ref 5–8)
PLATELET # BLD AUTO: 301 10*3/MM3 (ref 140–450)
PMV BLD AUTO: 9.9 FL (ref 6–12)
POTASSIUM SERPL-SCNC: 4.2 MMOL/L (ref 3.5–5.2)
PROT SERPL-MCNC: 7.1 G/DL (ref 6–8.5)
PROT UR QL STRIP: NEGATIVE
RBC # BLD AUTO: 4.62 10*6/MM3 (ref 3.77–5.28)
RBC # UR STRIP: NORMAL /HPF
REF LAB TEST METHOD: NORMAL
SODIUM SERPL-SCNC: 142 MMOL/L (ref 136–145)
SP GR UR STRIP: 1.01 (ref 1–1.03)
SQUAMOUS #/AREA URNS HPF: NORMAL /HPF
TRIGL SERPL-MCNC: 40 MG/DL (ref 0–150)
TSH SERPL DL<=0.05 MIU/L-ACNC: 2.08 UIU/ML (ref 0.27–4.2)
UROBILINOGEN UR QL STRIP: NORMAL
VLDLC SERPL-MCNC: 7 MG/DL (ref 5–40)
WBC # UR STRIP: NORMAL /HPF
WBC NRBC COR # BLD: 5.29 10*3/MM3 (ref 3.4–10.8)

## 2022-11-14 PROCEDURE — 80061 LIPID PANEL: CPT

## 2022-11-14 PROCEDURE — 84443 ASSAY THYROID STIM HORMONE: CPT

## 2022-11-14 PROCEDURE — 80053 COMPREHEN METABOLIC PANEL: CPT

## 2022-11-14 PROCEDURE — 85025 COMPLETE CBC W/AUTO DIFF WBC: CPT

## 2022-11-14 PROCEDURE — 81001 URINALYSIS AUTO W/SCOPE: CPT

## 2022-11-18 ENCOUNTER — OFFICE VISIT (OUTPATIENT)
Dept: INTERNAL MEDICINE | Facility: CLINIC | Age: 82
End: 2022-11-18

## 2022-11-18 VITALS
BODY MASS INDEX: 21.16 KG/M2 | DIASTOLIC BLOOD PRESSURE: 72 MMHG | OXYGEN SATURATION: 98 % | SYSTOLIC BLOOD PRESSURE: 122 MMHG | WEIGHT: 127 LBS | HEART RATE: 62 BPM | HEIGHT: 65 IN

## 2022-11-18 DIAGNOSIS — Z00.00 MEDICARE ANNUAL WELLNESS VISIT, SUBSEQUENT: Primary | Chronic | ICD-10-CM

## 2022-11-18 DIAGNOSIS — M17.0 PRIMARY OSTEOARTHRITIS OF BOTH KNEES: ICD-10-CM

## 2022-11-18 PROCEDURE — 1170F FXNL STATUS ASSESSED: CPT | Performed by: INTERNAL MEDICINE

## 2022-11-18 PROCEDURE — 93000 ELECTROCARDIOGRAM COMPLETE: CPT | Performed by: INTERNAL MEDICINE

## 2022-11-18 PROCEDURE — 99497 ADVNCD CARE PLAN 30 MIN: CPT | Performed by: INTERNAL MEDICINE

## 2022-11-18 PROCEDURE — 1159F MED LIST DOCD IN RCRD: CPT | Performed by: INTERNAL MEDICINE

## 2022-11-18 PROCEDURE — 1126F AMNT PAIN NOTED NONE PRSNT: CPT | Performed by: INTERNAL MEDICINE

## 2022-11-18 PROCEDURE — G0439 PPPS, SUBSEQ VISIT: HCPCS | Performed by: INTERNAL MEDICINE

## 2022-11-18 NOTE — PROGRESS NOTES
ANNUAL WELLNESS VISIT    DRUG AND ALCOHOL USE      no tobacco use, alcohol intake:glass of wine with dinner and caffeine intake: 2 cups of caffeinated coffee per day    DIET AND PHYSICAL ACTIVITY     Diet: general    Exercise: frequently   Exercise Details: participates in active age appropriate play    MOOD DISORDER AND COGNITIVE SCREENING     PHQ-2 Depression Screening - components reviewed with patient; screening is negative for active depression.    Little interest or pleasure in doing things? 0-->not at all0   Feeling down, depressed, or hopeless? 0-->not at all0   PHQ-2 Total Score 00      Anxiety Screening Tool Used YES     AUDIT screening 3     Mini-Cog Performed   Yes    1. Tell Patient 3 Words car apple pen    2. Administer Clock Test normal    3. Recall 3 words  car apple pen    4. Number Correct Items 3    FUNCTIONAL ABILITY AND LEVEL OF SAFETY   Hearing mild hearing loss     Wears Hearing Aids No       Current Activities Independent      none  - see Funct/Cog Status Intake     Fall Risk Assessment       Has difficulty with walking or balance  No         Timed Up and Go (TUG) Test  8 sec.       If >12 sec, normal    ADVANCED DIRECTIVE  Advance Care Planning   ACP discussion was held with the patient during this visit. Patient has an advance directive (not in EMR), copy was brought by the patient today - reviewed the contents of her document in the office today.  Advance Care Planning Discussion:  16 min or more spent on counseling; patient has advanced directive and living will.  Reviewed desires for end of life care, which is to have comfort care.  Patient does not want extraordinary life-sustaining measures, including no prolonged artificial life support. Encouraged patient to ensure family is aware of desires/preferences. Confirmed  Joshua is her healthcare surrogate and daughters (Henrietta and Марина) are the alternates.    PAIN SCREENING Do you have pain right now? no      If so, 1-10  "scale: 0     Intermittent     Do you have pain every day? No      Probable chronic pain: No     Recent Hospitalizations:  No recent hospitalization(s)..     MEDICATION REVIEW   - updated and reviewed (see Medication List).   - reviewed for potentially harmful drug-disease interactions in the elderly.   - reviewed for high risk medications in the elderly.   - aspirin use: No    BMI  Body mass index is 21.13 kg/m².  BMI is within normal parameters. No other follow-up for BMI required.    _____________________________________________    Chief Complaint   Patient presents with   • Medicare Wellness-subsequent   • Osteoarthritis     knees       History of Present Illness  82 y.o.  woman presents for updated wellness visit and f/u on knee arthritis. Reports stable bilat knee pain L>R but no falls. Already on turmeric but is not using topical creams currently.    [employer] Surrogate designation form today - confirmed with patient  is her healthcare surrogate; however he is 89yo, so patient confirmed 2 daughters are the alternates.     Review of Systems  Denies headaches, visual changes, CP, palpitations, SOB, cough, abd pain, n/v/d, difficulty with urination, numbness/tingling, falls, mood changes, lightheadedness, hearing changes, rashes.    Denies vaginal discharge or bleeding (no periods) or breast concerns.   All other ROS reviewed and negative.    Current Outpatient Medications:   •  Calcium Carb-Cholecalciferol (CALCIUM 1000 + D PO) QD  •  Cholecalciferol (VITAMIN D3) 5000 units QD  •  coenzyme Q10 100 MG QD  •  JOINT SUPPORT QD  •  Multiple Vitamins-Minerals QD  •  Omega-3 1000 MG QD  •  Probiotic Product QD  •  TURMERIC QD  •  vitamin C (ASCORBIC ACID) 500 MG QD    VITALS:  /72   Pulse 62   Ht 165.1 cm (65\")   Wt 57.6 kg (127 lb)   SpO2 98%   BMI 21.13 kg/m²     Physical Exam  Vitals and nursing note reviewed.   Constitutional:       General: She is not in acute distress.     " Appearance: Normal appearance. She is well-developed.   HENT:      Head: Normocephalic.      Right Ear: Tympanic membrane, ear canal and external ear normal.      Left Ear: Tympanic membrane, ear canal and external ear normal.      Nose: Nose normal.   Eyes:      Extraocular Movements: Extraocular movements intact.      Conjunctiva/sclera: Conjunctivae normal.      Pupils: Pupils are equal, round, and reactive to light.      Comments: Wearing glasses   Neck:      Vascular: No carotid bruit (bilaterally).   Cardiovascular:      Rate and Rhythm: Normal rate and regular rhythm.      Heart sounds: Normal heart sounds.   Pulmonary:      Effort: Pulmonary effort is normal. No respiratory distress.      Breath sounds: Normal breath sounds. No wheezing or rales.   Abdominal:      General: Bowel sounds are normal. There is no distension.      Palpations: Abdomen is soft. There is no mass.      Tenderness: There is no abdominal tenderness.   Genitourinary:     Comments: Chaperone declined by patient.    Breast exam unremarkable without masses, skin changes, nipple discharge, or axillary adenopathy.    Musculoskeletal:         General: Deformity (arthritic changes bilat knees) present.      Cervical back: Normal range of motion and neck supple.      Right lower leg: No edema.      Left lower leg: No edema.   Lymphadenopathy:      Cervical: No cervical adenopathy.   Skin:     General: Skin is warm and dry.      Findings: No rash.   Neurological:      Mental Status: She is alert and oriented to person, place, and time.      Cranial Nerves: No cranial nerve deficit.      Gait: Gait normal.      Deep Tendon Reflexes: Reflexes normal.   Psychiatric:         Mood and Affect: Mood normal.         Behavior: Behavior normal.           LABS  Results for orders placed or performed in visit on 11/14/22   Comprehensive Metabolic Panel    Specimen: Blood   Result Value Ref Range    Glucose 88 65 - 99 mg/dL    BUN 12 8 - 23 mg/dL     Creatinine 0.62 0.57 - 1.00 mg/dL    Sodium 142 136 - 145 mmol/L    Potassium 4.2 3.5 - 5.2 mmol/L    Chloride 101 98 - 107 mmol/L    CO2 31.0 (H) 22.0 - 29.0 mmol/L    Calcium 10.2 8.6 - 10.5 mg/dL    Total Protein 7.1 6.0 - 8.5 g/dL    Albumin 4.40 3.50 - 5.20 g/dL    ALT (SGPT) 16 1 - 33 U/L    AST (SGOT) 25 1 - 32 U/L    Alkaline Phosphatase 67 39 - 117 U/L    Total Bilirubin 0.5 0.0 - 1.2 mg/dL    Globulin 2.7 gm/dL    A/G Ratio 1.6 g/dL    BUN/Creatinine Ratio 19.4 7.0 - 25.0    Anion Gap 10.0 5.0 - 15.0 mmol/L    eGFR 89.6 >60.0 mL/min/1.73   Lipid Panel    Specimen: Blood   Result Value Ref Range    Total Cholesterol 206 (H) 0 - 200 mg/dL    Triglycerides 40 0 - 150 mg/dL    HDL Cholesterol 83 (H) 40 - 60 mg/dL    LDL Cholesterol  116 (H) 0 - 100 mg/dL    VLDL Cholesterol 7 5 - 40 mg/dL    LDL/HDL Ratio 1.39    TSH    Specimen: Blood   Result Value Ref Range    TSH 2.080 0.270 - 4.200 uIU/mL   CBC Auto Differential    Specimen: Blood   Result Value Ref Range    WBC 5.29 3.40 - 10.80 10*3/mm3    RBC 4.62 3.77 - 5.28 10*6/mm3    Hemoglobin 13.2 12.0 - 15.9 g/dL    Hematocrit 38.2 34.0 - 46.6 %    MCV 82.7 79.0 - 97.0 fL    MCH 28.6 26.6 - 33.0 pg    MCHC 34.6 31.5 - 35.7 g/dL    RDW 11.7 (L) 12.3 - 15.4 %    RDW-SD 35.5 (L) 37.0 - 54.0 fl    MPV 9.9 6.0 - 12.0 fL    Platelets 301 140 - 450 10*3/mm3    Neutrophil % 50.6 42.7 - 76.0 %    Lymphocyte % 35.3 19.6 - 45.3 %    Monocyte % 9.6 5.0 - 12.0 %    Eosinophil % 3.2 0.3 - 6.2 %    Basophil % 0.9 0.0 - 1.5 %    Immature Grans % 0.4 0.0 - 0.5 %    Neutrophils, Absolute 2.67 1.70 - 7.00 10*3/mm3    Lymphocytes, Absolute 1.87 0.70 - 3.10 10*3/mm3    Monocytes, Absolute 0.51 0.10 - 0.90 10*3/mm3    Eosinophils, Absolute 0.17 0.00 - 0.40 10*3/mm3    Basophils, Absolute 0.05 0.00 - 0.20 10*3/mm3    Immature Grans, Absolute 0.02 0.00 - 0.05 10*3/mm3    nRBC 0.0 0.0 - 0.2 /100 WBC   Urinalysis without microscopic (no culture) - Urine, Clean Catch    Specimen: Urine,  Clean Catch   Result Value Ref Range    Color, UA Yellow Yellow, Straw    Appearance, UA Clear Clear    pH, UA 7.5 5.0 - 8.0    Specific Gravity, UA 1.006 1.005 - 1.030    Glucose, UA Negative Negative    Ketones, UA Negative Negative    Bilirubin, UA Negative Negative    Blood, UA Negative Negative    Protein, UA Negative Negative    Leuk Esterase, UA Negative Negative    Nitrite, UA Negative Negative    Urobilinogen, UA 0.2 E.U./dL 0.2 - 1.0 E.U./dL   Urinalysis, Microscopic Only - Urine, Clean Catch    Specimen: Urine, Clean Catch   Result Value Ref Range    RBC, UA 0-2 None Seen, 0-2 /HPF    WBC, UA 0-2 None Seen, 0-2 /HPF    Bacteria, UA None Seen None Seen /HPF    Squamous Epithelial Cells, UA 0-2 None Seen, 0-2 /HPF    Hyaline Casts, UA None Seen None Seen /LPF    Methodology Automated Microscopy        ECG 12 Lead    Date/Time: 11/18/2022 10:30 AM  Performed by: Nicole Arana MD  Authorized by: Nicole Arana MD   Comparison: compared with previous ECG from 11/13/2020  Similar to previous ECG  Rhythm: sinus rhythm  Rate: normal  BPM: 60  Conduction: conduction normal  Conduction: non-specific intraventricular conduction delay  ST Segments: ST segments normal  T Waves: T waves normal  QRS axis: normal  Other findings: low voltage and poor R wave progression  Clinical impression comment: stable EKG              ASSESSMENT/PLAN    Diagnoses and all orders for this visit:    1. Medicare annual wellness visit, subsequent (Primary)  Assessment & Plan:  Health maintenance -COVID19 and flu vaccines refused, Prevnar 2/16, PVX 11/17, Td due (had Tdap 7/12, good for 10 yrs) - rec she get at pharmacy; rec HAV and Shingrix- counseling given; mammo 3/4/22 [facility]; no further Paps unless abnlities; DEXA 12/20, repeat 2023; colonosc 2/14, repeat 2024 per Dr. Peoples; eye exam pending 12/22 (last 12/21 per patient); dental exam q6 mos, pending 12/22; (+) seat belt use    Consultants:  Patient Care Team:  Nicole Arana MD as PCP -  General (Internal Medicine)  Surinder Peoples MD as Consulting Physician (Colon and Rectal Surgery)  Fransico Juarez MD as Consulting Physician (Obstetrics and Gynecology)  Surinder Fontaine MD as Consulting Physician (Infectious Diseases)  Fransico Kimble MD as Consulting Physician (Dermatology)  Chay Francis III, MD as Consulting Physician (Ophthalmology)  Kimberley Baer MD as Consulting Physician (Dermatology)  Sid Infante MD as Consulting Physician (Dermatology)        Orders:  -     ECG 12 Lead    2. Osteoarthritis of both knees  Assessment & Plan:  Reports knee pain L>R; discussed again importance of quads/hamstring strengthening and fall precautions; already on turmeric and gluc chondroitin sulfate; rec trial of topical agents, such as capsaicin cream, biofreeze, aspercreme, icy hot        FOLLOW-UP  RTC 1yr for annual wellness visit; fasting labs the week prior to appt (CBC, CMP, TSH, lipids, UA/micro)      Electronically signed by:    Nicole Arana MD, FACP  11/18/2022

## 2022-11-18 NOTE — ASSESSMENT & PLAN NOTE
Health maintenance -COVID19 and flu vaccines refused, Prevnar 2/16, PVX 11/17, Td due (had Tdap 7/12, good for 10 yrs) - rec she get at pharmacy; rec HAV and Shingrix- counseling given; mammo 3/4/22 [facility]; no further Paps unless abnlities; DEXA 12/20, repeat 2023; colonosc 2/14, repeat 2024 per Dr. Peoples; eye exam pending 12/22 (last 12/21 per patient); dental exam q6 mos, pending 12/22; (+) seat belt use    Consultants:  Patient Care Team:  Nicole Arana MD as PCP - General (Internal Medicine)  Surinder Peoples MD as Consulting Physician (Colon and Rectal Surgery)  Fransico Juarez MD as Consulting Physician (Obstetrics and Gynecology)  Surinder Fontaine MD as Consulting Physician (Infectious Diseases)  Fransico Kimble MD as Consulting Physician (Dermatology)  Chay Francis III, MD as Consulting Physician (Ophthalmology)  Kimberley Baer MD as Consulting Physician (Dermatology)  Sid Infante MD as Consulting Physician (Dermatology)

## 2022-11-21 PROBLEM — M17.0 PRIMARY OSTEOARTHRITIS OF BOTH KNEES: Status: ACTIVE | Noted: 2021-11-15

## 2022-11-22 NOTE — ASSESSMENT & PLAN NOTE
Reports knee pain L>R; discussed again importance of quads/hamstring strengthening and fall precautions; already on turmeric and gluc chondroitin sulfate; rec trial of topical agents, such as capsaicin cream, biofreeze, aspercreme, icy hot

## 2023-11-07 DIAGNOSIS — Z00.00 MEDICARE ANNUAL WELLNESS VISIT, SUBSEQUENT: Primary | Chronic | ICD-10-CM

## 2023-11-27 ENCOUNTER — LAB (OUTPATIENT)
Dept: LAB | Facility: HOSPITAL | Age: 83
End: 2023-11-27
Payer: MEDICARE

## 2023-11-27 DIAGNOSIS — Z00.00 MEDICARE ANNUAL WELLNESS VISIT, SUBSEQUENT: ICD-10-CM

## 2023-11-27 LAB
ALBUMIN SERPL-MCNC: 4.4 G/DL (ref 3.5–5.2)
ALBUMIN/GLOB SERPL: 2 G/DL
ALP SERPL-CCNC: 74 U/L (ref 39–117)
ALT SERPL W P-5'-P-CCNC: 13 U/L (ref 1–33)
ANION GAP SERPL CALCULATED.3IONS-SCNC: 8.7 MMOL/L (ref 5–15)
AST SERPL-CCNC: 22 U/L (ref 1–32)
BACTERIA UR QL AUTO: NORMAL /HPF
BASOPHILS # BLD AUTO: 0.05 10*3/MM3 (ref 0–0.2)
BASOPHILS NFR BLD AUTO: 0.8 % (ref 0–1.5)
BILIRUB SERPL-MCNC: 0.4 MG/DL (ref 0–1.2)
BILIRUB UR QL STRIP: NEGATIVE
BUN SERPL-MCNC: 9 MG/DL (ref 8–23)
BUN/CREAT SERPL: 15.8 (ref 7–25)
CALCIUM SPEC-SCNC: 9.4 MG/DL (ref 8.6–10.5)
CHLORIDE SERPL-SCNC: 100 MMOL/L (ref 98–107)
CHOLEST SERPL-MCNC: 202 MG/DL (ref 0–200)
CLARITY UR: CLEAR
CO2 SERPL-SCNC: 28.3 MMOL/L (ref 22–29)
COLOR UR: YELLOW
CREAT SERPL-MCNC: 0.57 MG/DL (ref 0.57–1)
DEPRECATED RDW RBC AUTO: 37.2 FL (ref 37–54)
EGFRCR SERPLBLD CKD-EPI 2021: 90.9 ML/MIN/1.73
EOSINOPHIL # BLD AUTO: 0.21 10*3/MM3 (ref 0–0.4)
EOSINOPHIL NFR BLD AUTO: 3.4 % (ref 0.3–6.2)
ERYTHROCYTE [DISTWIDTH] IN BLOOD BY AUTOMATED COUNT: 11.9 % (ref 12.3–15.4)
GLOBULIN UR ELPH-MCNC: 2.2 GM/DL
GLUCOSE SERPL-MCNC: 87 MG/DL (ref 65–99)
GLUCOSE UR STRIP-MCNC: NEGATIVE MG/DL
HCT VFR BLD AUTO: 39.3 % (ref 34–46.6)
HDLC SERPL-MCNC: 78 MG/DL (ref 40–60)
HGB BLD-MCNC: 12.9 G/DL (ref 12–15.9)
HGB UR QL STRIP.AUTO: NEGATIVE
HYALINE CASTS UR QL AUTO: NORMAL /LPF
IMM GRANULOCYTES # BLD AUTO: 0.02 10*3/MM3 (ref 0–0.05)
IMM GRANULOCYTES NFR BLD AUTO: 0.3 % (ref 0–0.5)
KETONES UR QL STRIP: NEGATIVE
LDLC SERPL CALC-MCNC: 115 MG/DL (ref 0–100)
LDLC/HDLC SERPL: 1.47 {RATIO}
LEUKOCYTE ESTERASE UR QL STRIP.AUTO: NEGATIVE
LYMPHOCYTES # BLD AUTO: 1.73 10*3/MM3 (ref 0.7–3.1)
LYMPHOCYTES NFR BLD AUTO: 28.3 % (ref 19.6–45.3)
MCH RBC QN AUTO: 28.4 PG (ref 26.6–33)
MCHC RBC AUTO-ENTMCNC: 32.8 G/DL (ref 31.5–35.7)
MCV RBC AUTO: 86.4 FL (ref 79–97)
MONOCYTES # BLD AUTO: 0.6 10*3/MM3 (ref 0.1–0.9)
MONOCYTES NFR BLD AUTO: 9.8 % (ref 5–12)
NEUTROPHILS NFR BLD AUTO: 3.5 10*3/MM3 (ref 1.7–7)
NEUTROPHILS NFR BLD AUTO: 57.4 % (ref 42.7–76)
NITRITE UR QL STRIP: NEGATIVE
NRBC BLD AUTO-RTO: 0 /100 WBC (ref 0–0.2)
PH UR STRIP.AUTO: 8 [PH] (ref 5–8)
PLATELET # BLD AUTO: 306 10*3/MM3 (ref 140–450)
PMV BLD AUTO: 10 FL (ref 6–12)
POTASSIUM SERPL-SCNC: 4.6 MMOL/L (ref 3.5–5.2)
PROT SERPL-MCNC: 6.6 G/DL (ref 6–8.5)
PROT UR QL STRIP: NEGATIVE
RBC # BLD AUTO: 4.55 10*6/MM3 (ref 3.77–5.28)
RBC # UR STRIP: NORMAL /HPF
REF LAB TEST METHOD: NORMAL
SODIUM SERPL-SCNC: 137 MMOL/L (ref 136–145)
SP GR UR STRIP: 1.01 (ref 1–1.03)
SQUAMOUS #/AREA URNS HPF: NORMAL /HPF
TRIGL SERPL-MCNC: 47 MG/DL (ref 0–150)
TSH SERPL DL<=0.05 MIU/L-ACNC: 1.84 UIU/ML (ref 0.27–4.2)
UROBILINOGEN UR QL STRIP: NORMAL
VLDLC SERPL-MCNC: 9 MG/DL (ref 5–40)
WBC # UR STRIP: NORMAL /HPF
WBC NRBC COR # BLD AUTO: 6.11 10*3/MM3 (ref 3.4–10.8)

## 2023-11-27 PROCEDURE — 84443 ASSAY THYROID STIM HORMONE: CPT

## 2023-11-27 PROCEDURE — 81001 URINALYSIS AUTO W/SCOPE: CPT

## 2023-11-27 PROCEDURE — 80061 LIPID PANEL: CPT

## 2023-11-27 PROCEDURE — 85025 COMPLETE CBC W/AUTO DIFF WBC: CPT

## 2023-11-27 PROCEDURE — 80053 COMPREHEN METABOLIC PANEL: CPT

## 2023-11-30 PROBLEM — M17.0 PRIMARY OSTEOARTHRITIS OF BOTH KNEES: Chronic | Status: ACTIVE | Noted: 2021-11-15

## 2023-11-30 PROBLEM — L82.1 SEBORRHEIC KERATOSIS: Chronic | Status: ACTIVE | Noted: 2022-08-05

## 2023-11-30 NOTE — PROGRESS NOTES
ANNUAL WELLNESS VISIT    DRUG AND ALCOHOL USE      no tobacco use, alcohol intake:3 glasses of wine per week, and caffeine intake: 2 cups of caffeinated coffee per days    DIET AND PHYSICAL ACTIVITY     Diet: general    Exercise: daily   Exercise Details: walking, marva, barre, refit    MOOD DISORDER AND COGNITIVE SCREENING     PHQ-2 Depression Screening - components reviewed with patient; screening is negative for active depression.    Little interest or pleasure in doing things? 0-->not at all   Feeling down, depressed, or hopeless? 0-->not at all   PHQ-2 Total Score 0      Anxiety Screening Tool Used YES     AUDIT screening 3     Mini-Cog Performed   Yes    1. Tell Patient 3 Words Apple table danitza    2. Administer Clock Test normal    3. Recall 3 words  Apple table danitza     4. Number Correct Items 3    FUNCTIONAL ABILITY AND LEVEL OF SAFETY   Hearing no hearing loss     Wears Hearing Aids No       Current Activities Independent      none  - see Funct/Cog Status Intake     Fall Risk Assessment       Has difficulty with walking or balance  No         Timed Up and Go (TUG) Test  8 sec.       If >12 sec, normal    ADVANCED DIRECTIVE  Advance Care Planning   ACP discussion was held with the patient during this visit. Patient has an advance directive in EMR which is still valid.   Advance Care Planning Discussion:  Patient has advanced directive and living will.  Reviewed desires for end of life care, which is to have comfort care.  Patient does not want extraordinary life-sustaining measures, including no prolonged artificial life support. Encouraged patient to ensure family is aware of desires/preferences. Confirmed  is her healthcare surrogate.    PAIN SCREENING Do you have pain right now? yes      If so, 1-10 scale: 1     Intermittent     Do you have pain every day? No      Probable chronic pain: No     Recent Hospitalizations:  No recent hospitalization(s)..     MEDICATION REVIEW   - updated and  "reviewed (see Medication List).   - reviewed for potentially harmful drug-disease interactions in the elderly.   - reviewed for high risk medications in the elderly.   - aspirin use: No    BMI  Body mass index is 22.42 kg/m².  BMI is within normal parameters. No other follow-up for BMI required.       _________________________________________________________    Chief Complaint   Patient presents with    Medicare Wellness-subsequent    Osteoarthritis of both knees       History of Present Illness  83 y.o.  woman female presents for updated wellness visit and f/u on labs. Feels well overall, staying active, denies falls.    Review of Systems  Denies headaches, visual changes, CP, palpitations, SOB, cough, abd pain, n/v/d, difficulty with urination, numbness/tingling, falls, mood changes, lightheadedness, hearing changes, rashes.  Denies vaginal discharge or bleeding (no periods) or breast concerns.   All other ROS reviewed and negative.    SH: has 94yo ; they have been  56 years.    Current Outpatient Medications:     Calcium Carb-Cholecalciferol QD    Cholecalciferol (VITAMIN D3) 5000 units QD    coenzyme Q10 100 MG QD    Menaquinone-7 (VITAMIN K2 PO) +CoQ10 QD    OINT SUPPORT QD    Multiple Vitamins-Minerals QD    Omega-3 1000 MG QD    Probiotic Product QD    TURMERIC QD    vitamin C (ASCORBIC ACID) 500 MG QD    OPIOID SCREENING:  The patient does not have opioid prescription on her medication list. Medication list has been reviewed with the patient regarding potentially high risk medications and harmful drug interactions in patients over age 65.    VITALS:  /60   Pulse 65   Temp 97.8 °F (36.6 °C)   Ht 160.7 cm (63.25\")   Wt 57.9 kg (127 lb 9.6 oz)   SpO2 99%   BMI 22.42 kg/m²     Physical Exam  Vitals and nursing note reviewed.   Constitutional:       General: She is not in acute distress.     Appearance: Normal appearance. She is well-developed.   HENT:      Head: Normocephalic. "      Right Ear: Ear canal and external ear normal. There is impacted cerumen (right auditory canal occluded by cerumen).      Left Ear: Ear canal and external ear normal. There is no impacted cerumen (mild cerumen left auditory canal).      Nose: Nose normal.   Eyes:      Extraocular Movements: Extraocular movements intact.      Conjunctiva/sclera: Conjunctivae normal.      Pupils: Pupils are equal, round, and reactive to light.   Neck:      Vascular: No carotid bruit (bilaterally).   Cardiovascular:      Rate and Rhythm: Normal rate and regular rhythm.      Heart sounds: Normal heart sounds. Murmur (II/VI SM LUSB) heard.   Pulmonary:      Effort: Pulmonary effort is normal. No respiratory distress.      Breath sounds: Normal breath sounds. No wheezing or rales.   Abdominal:      General: Bowel sounds are normal. There is no distension.      Palpations: Abdomen is soft. There is no mass.      Tenderness: There is no abdominal tenderness.   Genitourinary:     Comments: Chaperone declined by patient.    Breast exam with mild fibrocystic changes bilaterally; otherwise, unremarkable without masses, skin changes, nipple discharge, or axillary adenopathy.     Musculoskeletal:      Cervical back: Normal range of motion and neck supple.      Right lower leg: No edema.      Left lower leg: No edema.   Lymphadenopathy:      Cervical: No cervical adenopathy.   Skin:     General: Skin is warm and dry.      Findings: No rash.   Neurological:      Mental Status: She is alert and oriented to person, place, and time.      Gait: Gait normal.   Psychiatric:         Mood and Affect: Mood normal.         Behavior: Behavior normal.         LABS  Results for orders placed or performed in visit on 11/27/23   Comprehensive Metabolic Panel    Specimen: Blood   Result Value Ref Range    Glucose 87 65 - 99 mg/dL    BUN 9 8 - 23 mg/dL    Creatinine 0.57 0.57 - 1.00 mg/dL    Sodium 137 136 - 145 mmol/L    Potassium 4.6 3.5 - 5.2 mmol/L     Chloride 100 98 - 107 mmol/L    CO2 28.3 22.0 - 29.0 mmol/L    Calcium 9.4 8.6 - 10.5 mg/dL    Total Protein 6.6 6.0 - 8.5 g/dL    Albumin 4.4 3.5 - 5.2 g/dL    ALT (SGPT) 13 1 - 33 U/L    AST (SGOT) 22 1 - 32 U/L    Alkaline Phosphatase 74 39 - 117 U/L    Total Bilirubin 0.4 0.0 - 1.2 mg/dL    Globulin 2.2 gm/dL    A/G Ratio 2.0 g/dL    BUN/Creatinine Ratio 15.8 7.0 - 25.0    Anion Gap 8.7 5.0 - 15.0 mmol/L    eGFR 90.9 >60.0 mL/min/1.73   Lipid Panel    Specimen: Blood   Result Value Ref Range    Total Cholesterol 202 (H) 0 - 200 mg/dL    Triglycerides 47 0 - 150 mg/dL    HDL Cholesterol 78 (H) 40 - 60 mg/dL    LDL Cholesterol  115 (H) 0 - 100 mg/dL    VLDL Cholesterol 9 5 - 40 mg/dL    LDL/HDL Ratio 1.47    TSH    Specimen: Blood   Result Value Ref Range    TSH 1.840 0.270 - 4.200 uIU/mL   CBC Auto Differential    Specimen: Blood   Result Value Ref Range    WBC 6.11 3.40 - 10.80 10*3/mm3    RBC 4.55 3.77 - 5.28 10*6/mm3    Hemoglobin 12.9 12.0 - 15.9 g/dL    Hematocrit 39.3 34.0 - 46.6 %    MCV 86.4 79.0 - 97.0 fL    MCH 28.4 26.6 - 33.0 pg    MCHC 32.8 31.5 - 35.7 g/dL    RDW 11.9 (L) 12.3 - 15.4 %    RDW-SD 37.2 37.0 - 54.0 fl    MPV 10.0 6.0 - 12.0 fL    Platelets 306 140 - 450 10*3/mm3    Neutrophil % 57.4 42.7 - 76.0 %    Lymphocyte % 28.3 19.6 - 45.3 %    Monocyte % 9.8 5.0 - 12.0 %    Eosinophil % 3.4 0.3 - 6.2 %    Basophil % 0.8 0.0 - 1.5 %    Immature Grans % 0.3 0.0 - 0.5 %    Neutrophils, Absolute 3.50 1.70 - 7.00 10*3/mm3    Lymphocytes, Absolute 1.73 0.70 - 3.10 10*3/mm3    Monocytes, Absolute 0.60 0.10 - 0.90 10*3/mm3    Eosinophils, Absolute 0.21 0.00 - 0.40 10*3/mm3    Basophils, Absolute 0.05 0.00 - 0.20 10*3/mm3    Immature Grans, Absolute 0.02 0.00 - 0.05 10*3/mm3    nRBC 0.0 0.0 - 0.2 /100 WBC   Urinalysis without microscopic (no culture) - Urine, Clean Catch    Specimen: Urine, Clean Catch   Result Value Ref Range    Color, UA Yellow Yellow, Straw    Appearance, UA Clear Clear    pH, UA 8.0  5.0 - 8.0    Specific Gravity, UA 1.007 1.005 - 1.030    Glucose, UA Negative Negative    Ketones, UA Negative Negative    Bilirubin, UA Negative Negative    Blood, UA Negative Negative    Protein, UA Negative Negative    Leuk Esterase, UA Negative Negative    Nitrite, UA Negative Negative    Urobilinogen, UA 0.2 E.U./dL 0.2 - 1.0 E.U./dL   Urinalysis, Microscopic Only - Urine, Clean Catch    Specimen: Urine, Clean Catch   Result Value Ref Range    RBC, UA 0-2 None Seen, 0-2 /HPF    WBC, UA 0-2 None Seen, 0-2 /HPF    Bacteria, UA None Seen None Seen /HPF    Squamous Epithelial Cells, UA None Seen None Seen, 0-2 /HPF    Hyaline Casts, UA None Seen None Seen /LPF    Methodology Automated Microscopy        ECG 12 Lead    Date/Time: 12/1/2023 10:50 AM  Performed by: Nicole Arana MD    Authorized by: Nicole Arana MD  Comparison: compared with previous ECG from 11/18/2022  Similar to previous ECG  Rhythm: sinus rhythm and sinus bradycardia  Rate: normal  BPM: 58  Conduction: conduction normal  ST Segments: ST segments normal  T Waves: T waves normal  QRS axis: normal  Clinical impression comment: stable EKG            ASSESSMENT/PLAN    Diagnoses and all orders for this visit:    1. Medicare annual wellness visit, subsequent (Primary)  Assessment & Plan:  Health maintenance - flu vacc and COVID19 vacc refused, counseling given re: RSV vacc - get at pharmacy if she chooses to proceed; Prevnar 2/16, PVX 11/17, Tdap 7/12, rec HAV and Shingrix - reviewed improved MCR coverage this year, get at pharmacy; mammo 4/5/23 [facility]; no further Paps unless abnlities; DEXA ordered (last 12/20); colonosc 2/14, repeat 2024 per Dr. Peoples; eye exam pending 12/23/23 per pt; dental exam q6 mos; (+) seat belt use    Consultants:  Patient Care Team:  Nicole Arana MD as PCP - General (Internal Medicine)  Richelle, Surinder KRISHNAMURTHY MD as Consulting Physician (Colon and Rectal Surgery)  Fransico Juarez MD as Consulting Physician (Obstetrics and  Gynecology)  Surinder Fontaine MD as Consulting Physician (Infectious Diseases)  Fransico Kimble MD as Consulting Physician (Dermatology)  Chay Francis III, MD as Consulting Physician (Ophthalmology)  Kimberley Baer MD as Consulting Physician (Dermatology)  Sid Infante MD as Consulting Physician (Dermatology)      Orders:  -     ECG 12 Lead    2. Menopause  -     DEXA Bone Density Axial; Future    3. Right impacted cerumen, left excessive cerumen  Comments:  s/p lavage with water pick and instrumentation w/ ear scoop bilaterally, no complications, tolerated procedure well        FOLLOW-UP  RTC 1 yr for annual wellness; fasting labs the week prior to appt (CBC, CMP, TSH, lipids, UA/micro)      Electronically signed by:    Nicole Arana MD, FACP  12/01/2023

## 2023-12-01 ENCOUNTER — OFFICE VISIT (OUTPATIENT)
Dept: INTERNAL MEDICINE | Facility: CLINIC | Age: 83
End: 2023-12-01
Payer: MEDICARE

## 2023-12-01 VITALS
SYSTOLIC BLOOD PRESSURE: 112 MMHG | OXYGEN SATURATION: 99 % | WEIGHT: 127.6 LBS | TEMPERATURE: 97.8 F | DIASTOLIC BLOOD PRESSURE: 60 MMHG | BODY MASS INDEX: 22.61 KG/M2 | HEIGHT: 63 IN | HEART RATE: 65 BPM

## 2023-12-01 DIAGNOSIS — Z78.0 MENOPAUSE: Chronic | ICD-10-CM

## 2023-12-01 DIAGNOSIS — H61.23 BILATERAL IMPACTED CERUMEN: ICD-10-CM

## 2023-12-01 DIAGNOSIS — Z00.00 MEDICARE ANNUAL WELLNESS VISIT, SUBSEQUENT: Primary | ICD-10-CM

## 2023-12-01 PROCEDURE — 1159F MED LIST DOCD IN RCRD: CPT | Performed by: INTERNAL MEDICINE

## 2023-12-01 PROCEDURE — 1160F RVW MEDS BY RX/DR IN RCRD: CPT | Performed by: INTERNAL MEDICINE

## 2023-12-01 PROCEDURE — G0439 PPPS, SUBSEQ VISIT: HCPCS | Performed by: INTERNAL MEDICINE

## 2023-12-01 PROCEDURE — 1170F FXNL STATUS ASSESSED: CPT | Performed by: INTERNAL MEDICINE

## 2023-12-01 PROCEDURE — 93000 ELECTROCARDIOGRAM COMPLETE: CPT | Performed by: INTERNAL MEDICINE

## 2023-12-01 NOTE — ASSESSMENT & PLAN NOTE
Health maintenance - flu vacc and COVID19 vacc refused, counseling given re: RSV vacc - get at pharmacy if she chooses to proceed; Prevnar 2/16, PVX 11/17, Tdap 7/12, rec HAV and Shingrix - reviewed improved MCR coverage this year, get at pharmacy; mammo 4/5/23 [facility]; no further Paps unless abnlities; DEXA ordered (last 12/20); colonosc 2/14, repeat 2024 per Dr. Peoples; eye exam pending 12/23/23 per pt; dental exam q6 mos; (+) seat belt use    Consultants:  Patient Care Team:  Nicole Arana MD as PCP - General (Internal Medicine)  Surinder Peoples MD as Consulting Physician (Colon and Rectal Surgery)  Fransico Juarez MD as Consulting Physician (Obstetrics and Gynecology)  Surinder Fontaine MD as Consulting Physician (Infectious Diseases)  Fransico Kimble MD as Consulting Physician (Dermatology)  Chay Francis III, MD as Consulting Physician (Ophthalmology)  Kimberley Baer MD as Consulting Physician (Dermatology)  Sid Infante MD as Consulting Physician (Dermatology)

## 2024-01-11 ENCOUNTER — HOSPITAL ENCOUNTER (OUTPATIENT)
Dept: BONE DENSITY | Facility: HOSPITAL | Age: 84
Discharge: HOME | End: 2024-01-11
Admitting: INTERNAL MEDICINE
Payer: MEDICARE

## 2024-01-11 DIAGNOSIS — Z78.0 MENOPAUSE: Chronic | ICD-10-CM

## 2024-01-11 PROCEDURE — 77080 DXA BONE DENSITY AXIAL: CPT

## 2024-01-16 PROBLEM — M85.89 OSTEOPENIA OF MULTIPLE SITES: Status: ACTIVE | Noted: 2024-01-16

## 2024-01-18 ENCOUNTER — TELEPHONE (OUTPATIENT)
Dept: INTERNAL MEDICINE | Facility: CLINIC | Age: 84
End: 2024-01-18
Payer: MEDICARE

## 2024-01-18 NOTE — TELEPHONE ENCOUNTER
----- Message from Nicole Arana MD sent at 1/16/2024  6:38 PM EST -----  Dear Maria Del Carmen,    Thank you for obtaining your DEXA (bone density) scan.  I have received those results and would like to review them with you.      Your bone density worsened and now in the osteopenic range.  This is between normal and osteoporosis. The hip bone density decreased 8.3% compared to your last DEXA in 2020, and this is a significant drop.    The values indicate that your risk of a major fracture in the next 10 years is 17%.    Please maintain regular calcium and vitamin D supplementation.  Calcium intake should be 1000mg per day between diet and supplements.  Weight bearing exercise is good for bone health as well.    We should plan to repeat your DEXA in 2 years.  Please let me know if you have any questions or concerns regarding these results.         Sincerely,  Nicole Arana MD, FACP

## 2024-11-01 DIAGNOSIS — Z00.00 MEDICARE ANNUAL WELLNESS VISIT, SUBSEQUENT: Primary | Chronic | ICD-10-CM

## 2024-11-27 ENCOUNTER — LAB (OUTPATIENT)
Dept: LAB | Facility: HOSPITAL | Age: 84
End: 2024-11-27
Payer: MEDICARE

## 2024-11-27 DIAGNOSIS — Z00.00 MEDICARE ANNUAL WELLNESS VISIT, SUBSEQUENT: ICD-10-CM

## 2024-11-27 LAB
ALBUMIN SERPL-MCNC: 4.3 G/DL (ref 3.5–5.2)
ALBUMIN/GLOB SERPL: 1.8 G/DL
ALP SERPL-CCNC: 65 U/L (ref 39–117)
ALT SERPL W P-5'-P-CCNC: 15 U/L (ref 1–33)
ANION GAP SERPL CALCULATED.3IONS-SCNC: 9.7 MMOL/L (ref 5–15)
AST SERPL-CCNC: 23 U/L (ref 1–32)
BACTERIA UR QL AUTO: NORMAL /HPF
BASOPHILS # BLD AUTO: 0.05 10*3/MM3 (ref 0–0.2)
BASOPHILS NFR BLD AUTO: 0.9 % (ref 0–1.5)
BILIRUB SERPL-MCNC: 0.5 MG/DL (ref 0–1.2)
BILIRUB UR QL STRIP: NEGATIVE
BUN SERPL-MCNC: 10 MG/DL (ref 8–23)
BUN/CREAT SERPL: 15.6 (ref 7–25)
CALCIUM SPEC-SCNC: 9.4 MG/DL (ref 8.6–10.5)
CHLORIDE SERPL-SCNC: 100 MMOL/L (ref 98–107)
CHOLEST SERPL-MCNC: 217 MG/DL (ref 0–200)
CLARITY UR: CLEAR
CO2 SERPL-SCNC: 27.3 MMOL/L (ref 22–29)
COLOR UR: YELLOW
CREAT SERPL-MCNC: 0.64 MG/DL (ref 0.57–1)
DEPRECATED RDW RBC AUTO: 37 FL (ref 37–54)
EGFRCR SERPLBLD CKD-EPI 2021: 87.8 ML/MIN/1.73
EOSINOPHIL # BLD AUTO: 0.19 10*3/MM3 (ref 0–0.4)
EOSINOPHIL NFR BLD AUTO: 3.6 % (ref 0.3–6.2)
ERYTHROCYTE [DISTWIDTH] IN BLOOD BY AUTOMATED COUNT: 11.8 % (ref 12.3–15.4)
GLOBULIN UR ELPH-MCNC: 2.4 GM/DL
GLUCOSE SERPL-MCNC: 87 MG/DL (ref 65–99)
GLUCOSE UR STRIP-MCNC: NEGATIVE MG/DL
HCT VFR BLD AUTO: 38 % (ref 34–46.6)
HDLC SERPL-MCNC: 81 MG/DL (ref 40–60)
HGB BLD-MCNC: 12.7 G/DL (ref 12–15.9)
HGB UR QL STRIP.AUTO: NEGATIVE
HYALINE CASTS UR QL AUTO: NORMAL /LPF
IMM GRANULOCYTES # BLD AUTO: 0.02 10*3/MM3 (ref 0–0.05)
IMM GRANULOCYTES NFR BLD AUTO: 0.4 % (ref 0–0.5)
KETONES UR QL STRIP: NEGATIVE
LDLC SERPL CALC-MCNC: 129 MG/DL (ref 0–100)
LDLC/HDLC SERPL: 1.58 {RATIO}
LEUKOCYTE ESTERASE UR QL STRIP.AUTO: NEGATIVE
LYMPHOCYTES # BLD AUTO: 1.52 10*3/MM3 (ref 0.7–3.1)
LYMPHOCYTES NFR BLD AUTO: 28.4 % (ref 19.6–45.3)
MCH RBC QN AUTO: 28.7 PG (ref 26.6–33)
MCHC RBC AUTO-ENTMCNC: 33.4 G/DL (ref 31.5–35.7)
MCV RBC AUTO: 85.8 FL (ref 79–97)
MONOCYTES # BLD AUTO: 0.54 10*3/MM3 (ref 0.1–0.9)
MONOCYTES NFR BLD AUTO: 10.1 % (ref 5–12)
NEUTROPHILS NFR BLD AUTO: 3.03 10*3/MM3 (ref 1.7–7)
NEUTROPHILS NFR BLD AUTO: 56.6 % (ref 42.7–76)
NITRITE UR QL STRIP: NEGATIVE
NRBC BLD AUTO-RTO: 0 /100 WBC (ref 0–0.2)
PH UR STRIP.AUTO: 8 [PH] (ref 5–8)
PLATELET # BLD AUTO: 311 10*3/MM3 (ref 140–450)
PMV BLD AUTO: 9.7 FL (ref 6–12)
POTASSIUM SERPL-SCNC: 4.1 MMOL/L (ref 3.5–5.2)
PROT SERPL-MCNC: 6.7 G/DL (ref 6–8.5)
PROT UR QL STRIP: NEGATIVE
RBC # BLD AUTO: 4.43 10*6/MM3 (ref 3.77–5.28)
RBC # UR STRIP: NORMAL /HPF
REF LAB TEST METHOD: NORMAL
SODIUM SERPL-SCNC: 137 MMOL/L (ref 136–145)
SP GR UR STRIP: 1.01 (ref 1–1.03)
SQUAMOUS #/AREA URNS HPF: NORMAL /HPF
TRIGL SERPL-MCNC: 40 MG/DL (ref 0–150)
TSH SERPL DL<=0.05 MIU/L-ACNC: 2.06 UIU/ML (ref 0.27–4.2)
UROBILINOGEN UR QL STRIP: NORMAL
VLDLC SERPL-MCNC: 7 MG/DL (ref 5–40)
WBC # UR STRIP: NORMAL /HPF
WBC NRBC COR # BLD AUTO: 5.35 10*3/MM3 (ref 3.4–10.8)

## 2024-11-27 PROCEDURE — 81001 URINALYSIS AUTO W/SCOPE: CPT

## 2024-11-27 PROCEDURE — 85025 COMPLETE CBC W/AUTO DIFF WBC: CPT

## 2024-11-27 PROCEDURE — 80053 COMPREHEN METABOLIC PANEL: CPT

## 2024-11-27 PROCEDURE — 80061 LIPID PANEL: CPT

## 2024-11-27 PROCEDURE — 84443 ASSAY THYROID STIM HORMONE: CPT

## 2024-12-04 PROBLEM — M85.89 OSTEOPENIA OF MULTIPLE SITES: Chronic | Status: ACTIVE | Noted: 2024-01-16

## 2024-12-05 ENCOUNTER — OFFICE VISIT (OUTPATIENT)
Dept: INTERNAL MEDICINE | Facility: CLINIC | Age: 84
End: 2024-12-05
Payer: MEDICARE

## 2024-12-05 VITALS
BODY MASS INDEX: 22.89 KG/M2 | DIASTOLIC BLOOD PRESSURE: 62 MMHG | OXYGEN SATURATION: 97 % | HEIGHT: 63 IN | WEIGHT: 129.2 LBS | SYSTOLIC BLOOD PRESSURE: 130 MMHG | HEART RATE: 70 BPM

## 2024-12-05 DIAGNOSIS — M85.89 OSTEOPENIA OF MULTIPLE SITES: Chronic | ICD-10-CM

## 2024-12-05 DIAGNOSIS — Z71.85 VACCINE COUNSELING: ICD-10-CM

## 2024-12-05 DIAGNOSIS — Z00.00 MEDICARE ANNUAL WELLNESS VISIT, SUBSEQUENT: Primary | ICD-10-CM

## 2024-12-05 PROBLEM — H25.012 CORTICAL AGE-RELATED CATARACT OF LEFT EYE: Chronic | Status: RESOLVED | Noted: 2017-11-07 | Resolved: 2024-12-05

## 2024-12-05 PROCEDURE — 1159F MED LIST DOCD IN RCRD: CPT | Performed by: INTERNAL MEDICINE

## 2024-12-05 PROCEDURE — 93000 ELECTROCARDIOGRAM COMPLETE: CPT | Performed by: INTERNAL MEDICINE

## 2024-12-05 PROCEDURE — 1170F FXNL STATUS ASSESSED: CPT | Performed by: INTERNAL MEDICINE

## 2024-12-05 PROCEDURE — G0439 PPPS, SUBSEQ VISIT: HCPCS | Performed by: INTERNAL MEDICINE

## 2024-12-05 PROCEDURE — 1126F AMNT PAIN NOTED NONE PRSNT: CPT | Performed by: INTERNAL MEDICINE

## 2024-12-05 PROCEDURE — 1160F RVW MEDS BY RX/DR IN RCRD: CPT | Performed by: INTERNAL MEDICINE

## 2024-12-05 NOTE — PROGRESS NOTES
ANNUAL WELLNESS VISIT    DRUG AND ALCOHOL USE      no tobacco use, alcohol intake:2 glasses of wine per week, and caffeine intake: 3 cups of caffeinated coffee per day, 1 cup of caffeinated tea    DIET AND PHYSICAL ACTIVITY     Diet: general, limited junk food    Exercise: infrequently   Exercise Details: walking, aerobics, stretching/yoga, pound class    MOOD DISORDER AND COGNITIVE SCREENING     PHQ-2 Depression Screening - components reviewed with patient; screening is negative for active depression.    Little interest or pleasure in doing things? Not at all   Feeling down, depressed, or hopeless? Not at all   PHQ-2 Total Score 0       Anxiety Screening Tool Used YES     AUDIT screening 2     Mini-Cog Performed   Yes    1. Tell Patient 3 Words Apple table danitza    2. Administer Clock Test normal    3. Recall 3 words  Apple table danitza     4. Number Correct Items 3    FUNCTIONAL ABILITY AND LEVEL OF SAFETY   Hearing mild hearing loss     Wears Hearing Aids No       Current Activities Independent      none  - see Funct/Cog Status Intake     Fall Risk Assessment       Has difficulty with walking or balance  No         Timed Up and Go (TUG) Test  9 sec.       If >12 sec, normal    ADVANCED DIRECTIVE  Advance Care Planning   ACP discussion was held with the patient during this visit. Patient has an advance directive in EMR which is still valid.   Advance Care Planning Discussion:  Patient has advanced directive and living will.  Reviewed desires for end of life care, which is to have comfort care.  Patient does not want extraordinary life-sustaining measures, including no prolonged artificial life support. Encouraged patient to ensure family is aware of desires/preferences. Confirmed  is her healthcare surrogate.    PAIN SCREENING Do you have pain right now? no      If so, 1-10 scale: 0     Intermittent     Do you have pain every day? No      Probable chronic pain: No     Recent Hospitalizations:  No  "hospitalization(s) within the last year..     MEDICATION REVIEW   - updated and reviewed (see Medication List).   - reviewed for potentially harmful drug-disease interactions in the elderly.   - reviewed for high risk medications in the elderly.   - aspirin use: No    BMI  Body mass index is 22.89 kg/m².  BMI is within normal parameters. No other follow-up for BMI required.       ___________________________________    Chief Complaint   Patient presents with    Medicare Wellness-subsequent    Osteopenia       History of Present Illness  84 y.o.  female presents for updated wellness visit and f/u on her labs and osteopenia.    Has not updated colonoscopy; worried about potential complications. Also notes mother had appendiceal CA, difficult to detect.    Reports successful bilat cataract surgery in 3/24 but now left with dry eyes and photosensitivity. Still wears readers.    Review of Systems  Denies headaches,  CP, palpitations, SOB, cough, abd pain, n/v/d, difficulty with urination, numbness/tingling, falls, mood changes, lightheadedness, hearing changes, rashes. ROS (+) for dry eyes and photosensitivity.  Denies vaginal discharge or bleeding (no periods) or breast concerns.    All other ROS reviewed and negative.    Current Outpatient Medications:     Calcium Carb-Cholecalciferol QD    Cholecalciferol (VITAMIN D3) 5000 units QD    coenzyme Q10 100 MG QD    Menaquinone-7 (VITAMIN K2 PO)  QD    JOINT SUPPORT QD    Multiple Vitamins Life Extension QD    Omega-3 1000 MG QD    Probiotic Product QD    TURMERIC QD    vitamin C (ASCORBIC ACID) 500 MG QD    OPIOID SCREENING:  The patient does not have opioid prescription on her medication list. Medication list has been reviewed with the patient regarding potentially high risk medications and harmful drug interactions in patients over age 65.    VITALS:  /62   Pulse 70   Ht 160 cm (63\")   Wt 58.6 kg (129 lb 3.2 oz)   SpO2 97%   BMI 22.89 kg/m² "     Physical Exam  Vitals and nursing note reviewed.   Constitutional:       General: She is not in acute distress.     Appearance: Normal appearance. She is well-developed.   HENT:      Head: Normocephalic.      Right Ear: Tympanic membrane, ear canal and external ear normal.      Left Ear: Tympanic membrane, ear canal and external ear normal.      Nose: Nose normal.   Eyes:      Extraocular Movements: Extraocular movements intact.      Conjunctiva/sclera: Conjunctivae normal.      Pupils: Pupils are equal, round, and reactive to light.   Neck:      Vascular: No carotid bruit (bilaterally).   Cardiovascular:      Rate and Rhythm: Normal rate and regular rhythm.      Heart sounds: Normal heart sounds.   Pulmonary:      Effort: Pulmonary effort is normal. No respiratory distress.      Breath sounds: Normal breath sounds. No wheezing, rhonchi or rales.   Abdominal:      General: Bowel sounds are normal. There is no distension.      Palpations: Abdomen is soft. There is no mass.      Tenderness: There is no abdominal tenderness.   Genitourinary:     Comments: Chaperone declined by patient.    Breast exam unremarkable without masses, skin changes, nipple discharge, or axillary adenopathy.      Musculoskeletal:      Cervical back: Normal range of motion and neck supple.      Right lower leg: No edema.      Left lower leg: No edema.   Lymphadenopathy:      Cervical: No cervical adenopathy.   Skin:     General: Skin is warm and dry.      Findings: No rash.   Neurological:      Mental Status: She is alert and oriented to person, place, and time.      Gait: Gait normal.   Psychiatric:         Mood and Affect: Mood normal.         Behavior: Behavior normal.         LABS  Results for orders placed or performed in visit on 11/27/24   Comprehensive Metabolic Panel    Collection Time: 11/27/24  9:32 AM    Specimen: Blood   Result Value Ref Range    Glucose 87 65 - 99 mg/dL    BUN 10 8 - 23 mg/dL    Creatinine 0.64 0.57 - 1.00  mg/dL    Sodium 137 136 - 145 mmol/L    Potassium 4.1 3.5 - 5.2 mmol/L    Chloride 100 98 - 107 mmol/L    CO2 27.3 22.0 - 29.0 mmol/L    Calcium 9.4 8.6 - 10.5 mg/dL    Total Protein 6.7 6.0 - 8.5 g/dL    Albumin 4.3 3.5 - 5.2 g/dL    ALT (SGPT) 15 1 - 33 U/L    AST (SGOT) 23 1 - 32 U/L    Alkaline Phosphatase 65 39 - 117 U/L    Total Bilirubin 0.5 0.0 - 1.2 mg/dL    Globulin 2.4 gm/dL    A/G Ratio 1.8 g/dL    BUN/Creatinine Ratio 15.6 7.0 - 25.0    Anion Gap 9.7 5.0 - 15.0 mmol/L    eGFR 87.8 >60.0 mL/min/1.73   Lipid Panel    Collection Time: 11/27/24  9:32 AM    Specimen: Blood   Result Value Ref Range    Total Cholesterol 217 (H) 0 - 200 mg/dL    Triglycerides 40 0 - 150 mg/dL    HDL Cholesterol 81 (H) 40 - 60 mg/dL    LDL Cholesterol  129 (H) 0 - 100 mg/dL    VLDL Cholesterol 7 5 - 40 mg/dL    LDL/HDL Ratio 1.58    TSH    Collection Time: 11/27/24  9:32 AM    Specimen: Blood   Result Value Ref Range    TSH 2.060 0.270 - 4.200 uIU/mL   CBC Auto Differential    Collection Time: 11/27/24  9:32 AM    Specimen: Blood   Result Value Ref Range    WBC 5.35 3.40 - 10.80 10*3/mm3    RBC 4.43 3.77 - 5.28 10*6/mm3    Hemoglobin 12.7 12.0 - 15.9 g/dL    Hematocrit 38.0 34.0 - 46.6 %    MCV 85.8 79.0 - 97.0 fL    MCH 28.7 26.6 - 33.0 pg    MCHC 33.4 31.5 - 35.7 g/dL    RDW 11.8 (L) 12.3 - 15.4 %    RDW-SD 37.0 37.0 - 54.0 fl    MPV 9.7 6.0 - 12.0 fL    Platelets 311 140 - 450 10*3/mm3    Neutrophil % 56.6 42.7 - 76.0 %    Lymphocyte % 28.4 19.6 - 45.3 %    Monocyte % 10.1 5.0 - 12.0 %    Eosinophil % 3.6 0.3 - 6.2 %    Basophil % 0.9 0.0 - 1.5 %    Immature Grans % 0.4 0.0 - 0.5 %    Neutrophils, Absolute 3.03 1.70 - 7.00 10*3/mm3    Lymphocytes, Absolute 1.52 0.70 - 3.10 10*3/mm3    Monocytes, Absolute 0.54 0.10 - 0.90 10*3/mm3    Eosinophils, Absolute 0.19 0.00 - 0.40 10*3/mm3    Basophils, Absolute 0.05 0.00 - 0.20 10*3/mm3    Immature Grans, Absolute 0.02 0.00 - 0.05 10*3/mm3    nRBC 0.0 0.0 - 0.2 /100 WBC   Urinalysis  without microscopic (no culture) - Urine, Clean Catch    Collection Time: 11/27/24  9:32 AM    Specimen: Urine, Clean Catch   Result Value Ref Range    Color, UA Yellow Yellow, Straw    Appearance, UA Clear Clear    pH, UA 8.0 5.0 - 8.0    Specific Gravity, UA 1.008 1.005 - 1.030    Glucose, UA Negative Negative    Ketones, UA Negative Negative    Bilirubin, UA Negative Negative    Blood, UA Negative Negative    Protein, UA Negative Negative    Leuk Esterase, UA Negative Negative    Nitrite, UA Negative Negative    Urobilinogen, UA 0.2 E.U./dL 0.2 - 1.0 E.U./dL   Urinalysis, Microscopic Only - Urine, Clean Catch    Collection Time: 11/27/24  9:32 AM    Specimen: Urine, Clean Catch   Result Value Ref Range    RBC, UA 0-2 None Seen, 0-2 /HPF    WBC, UA 0-2 None Seen, 0-2 /HPF    Bacteria, UA None Seen None Seen /HPF    Squamous Epithelial Cells, UA 0-2 None Seen, 0-2 /HPF    Hyaline Casts, UA None Seen None Seen /LPF    Methodology Automated Microscopy      11/27/23       ECG 12 Lead    Date/Time: 12/5/2024 12:03 PM  Performed by: Nicole Arana MD    Authorized by: Nicole Arana MD  Comparison: compared with previous ECG from 12/1/2023  Similar to previous ECG  Rhythm: sinus rhythm  Rate: normal  BPM: 61  Conduction: conduction normal  ST Segments: ST segments normal  T Waves: T waves normal  QRS axis: normal  Clinical impression comment: stable EKG            ASSESSMENT/PLAN    Diagnoses and all orders for this visit:    1. Medicare annual wellness visit, subsequent (Primary)  Assessment & Plan:  Health maintenance - flu vacc and COVID19 vacc refused, rec RSV vacc, counseling given (get at pharmacy); Prevnar 2/16, PVX 11/17, overdue for Tdap (last 7/12) - pt states she will get at pharmacy, rec HAV and Shingrix - advised pt of improved Shingrix coverage for MCR, get at pharmacy; mammo 4/2024 per patient at [facility] [place]; no further Paps unless abnlities; DEXA 1/24, repeat 2026; colonosc overdue (last 2/14, repeat 2024  per Dr. Peoples), reviewed risks/benefits of colon CA, linette in light her current age; eye exam UTD - bilat cataract surgery 3/24; dental exam q6 mos; (+) seat belt use    Consultants:  Patient Care Team:  Nicole Arana MD as PCP - General (Internal Medicine)  Surinder Peoples MD as Consulting Physician (Colon and Rectal Surgery)  Fransico Juarez MD as Consulting Physician (Obstetrics and Gynecology)  Surinder Fontaine MD as Consulting Physician (Infectious Diseases)  Fransico Kimble MD as Consulting Physician (Dermatology)  Chay Francis III, MD (Inactive) as Consulting Physician (Ophthalmology)  Kimberley Baer MD as Consulting Physician (Dermatology)  Sid Infante MD as Consulting Physician (Dermatology)      Orders:  -     ECG 12 Lead    2. Osteopenia  Assessment & Plan:  Calcium and vitamin D supplementation with weight-bearing exercise; DEXA 1/24, repeat 2026         3. Vaccine counseling  Comments:  rec updated Tdap; rec RSV vaccine and Shingrix - get all at pharmacy        FOLLOW-UP  RTC 1yr for annual wellness; fasting labs the week prior to appt (CBC, CMP, TSH, lipids, UA/micro)      Electronically signed by:    Nicole Arana MD, FACP  12/05/2024

## 2024-12-06 NOTE — ASSESSMENT & PLAN NOTE
Health maintenance - flu vacc and COVID19 vacc refused, rec RSV vacc, counseling given (get at pharmacy); Prevnar 2/16, PVX 11/17, overdue for Tdap (last 7/12) - pt states she will get at pharmacy, rec HAV and Shingrix - advised pt of improved Shingrix coverage for MCR, get at pharmacy; mammo 4/2024 per patient at [facility] [place]; no further Paps unless abnlities; DEXA 1/24, repeat 2026; colonosc overdue (last 2/14, repeat 2024 per Dr. Peoples), reviewed risks/benefits of colon CA, linette in light her current age; eye exam UTD - bilat cataract surgery 3/24; dental exam q6 mos; (+) seat belt use    Consultants:  Patient Care Team:  Nicole Arana MD as PCP - General (Internal Medicine)  Surinder Peoples MD as Consulting Physician (Colon and Rectal Surgery)  Fransico Juarez MD as Consulting Physician (Obstetrics and Gynecology)  Surinder Fontaine MD as Consulting Physician (Infectious Diseases)  Fransico Kimble MD as Consulting Physician (Dermatology)  Chay Francis III, MD (Inactive) as Consulting Physician (Ophthalmology)  Kimberley Baer MD as Consulting Physician (Dermatology)  Sid Infante MD as Consulting Physician (Dermatology)